# Patient Record
Sex: FEMALE | Race: OTHER | NOT HISPANIC OR LATINO | URBAN - METROPOLITAN AREA
[De-identification: names, ages, dates, MRNs, and addresses within clinical notes are randomized per-mention and may not be internally consistent; named-entity substitution may affect disease eponyms.]

---

## 2021-03-25 VITALS
HEART RATE: 60 BPM | SYSTOLIC BLOOD PRESSURE: 181 MMHG | RESPIRATION RATE: 16 BRPM | WEIGHT: 145.06 LBS | TEMPERATURE: 97 F | HEIGHT: 66 IN | DIASTOLIC BLOOD PRESSURE: 75 MMHG | OXYGEN SATURATION: 99 %

## 2021-03-25 NOTE — H&P ADULT - NSICDXPASTSURGICALHX_GEN_ALL_CORE_FT
PAST SURGICAL HISTORY:  H/O lumpectomy     H/O:      S/P appendectomy     S/P dilation and curettage

## 2021-03-25 NOTE — H&P ADULT - ASSESSMENT
75 yo F w/ a PMH of HTN, HLD, DM, TIA in 2011 (no residual deficits), PVD, Breast CA s/p chemo/radiation (on Anastrazole) who presented to outpatient cardiologist Dr. Juan Manuel Irvin for routine follow up. Pt states she is totally ASX.  Pt had a CCTA 2/24/21 with Ca score 1848 (90th percentile). Of note: remainder of CCTA had to be aborted because BP was too high.  Pt now presents for cardiac cath with possible intervention for suspected CAD.      ASA _III____		Mallampati class: __III_______	                Sedation Plan:  Moderate     Patient Is Suitable Candidate For Sedation: Yes       Risks & benefits of procedure and sedation and risks and benefits for the alternative therapy have been explained to the patient in layman’s terms including but not limited to: allergic reaction, bleeding, infection, arrhythmia, respiratory compromise, renal and vascular compromise, limb damage, MI, CVA, emergent CABG/Vascular Surgery and death. Informed consent obtained and in chart.    Plan:  -consent signed and in chart  -hypoglycemia - FS 69, treated with 1/2 amp of D50  -IVF: NS @ 75/hr   -Meds verified  -ASA 325mg given (documented NSAID allergy, however, pt states that she took aspirin in the past without any adverse allergic reaction)  -load Plavix 600mg POx1

## 2021-03-25 NOTE — H&P ADULT - NSICDXPASTMEDICALHX_GEN_ALL_CORE_FT
PAST MEDICAL HISTORY:  Breast cancer     DM (diabetes mellitus)     HLD (hyperlipidemia)     HTN (hypertension)

## 2021-03-25 NOTE — H&P ADULT - HISTORY OF PRESENT ILLNESS
Cardiologist: Dr. Juan Manuel Irvin  Escort: Daughter  Pharamcy: *[place] Patient*  COVID: 3/26 @ [facility]  *Verify Meds -- of note traveled from [facility] on 3/22 and quarantined x4days*    77 yo F w/ a PMH of HTN, HLD, DM, TIA in 2011 (no residual deficits), PVD, Breast CA s/p chemo/radiation (on Anastrazole) who presented to outpatient cardiologist Dr. Juan Manuel Irvin for routine follow up. Pt states she is totally ASX. Denies CP, SOB, dizziness, diaphoresis, palpitations, orthopnea/PND, abdominal pain, N/V/D, urinary sx, BRBPR, hematuria, melena, LE swelling, recent travel/sick contacts/illness. CCTA 2/24/21: Ca score 1848 (90th percentile). In light of patient’s risk factors and abnormal calcium score, patient is referred for cardiac catheterization with possible intervention if clinically indicated.    Cardiologist: Dr. Juan Manuel Irvin  Escort: Daughter  Pharamcy: *[place] Patient*  COVID: 3/26 @ [facility]  *Verify Meds -- of note traveled from [facility] on 3/22 and quarantined x4days*    75 yo F w/ a PMH of HTN, HLD, DM, TIA in 2011 (no residual deficits), PVD, Breast CA s/p chemo/radiation (on Anastrazole) who presented to outpatient cardiologist Dr. Juan Manuel Irvin for routine follow up. Pt states she is totally ASX. Denies CP, SOB, dizziness, diaphoresis, palpitations, orthopnea/PND, abdominal pain, N/V/D, urinary sx, BRBPR, hematuria, melena, LE swelling, recent travel/sick contacts/illness. CCTA 2/24/21: Ca score 1848 (90th percentile). Of note: remainder of CCTA had to be aborted because BP was too high. In light of patient’s risk factors and abnormal calcium score, patient is referred for cardiac catheterization with possible intervention if clinically indicated.    Cardiologist: Dr. Juan Manuel Irvin  Escort: Daughter  Pharamcy: *[place] Patient*  COVID: 3/26 @ [facility]  *Verify Meds -- of note traveled from [place] on 3/22 and quarantined x4days*  *Hx obtained via pts daughter Onur*    77 yo F w/ a PMH of HTN, HLD, DM, TIA in 2011 (no residual deficits), PVD, Breast CA s/p chemo/radiation (on Anastrazole) who presented to outpatient cardiologist Dr. Juan Manuel Irvin for routine follow up. Pt states she is totally ASX. Denies CP, SOB, dizziness, diaphoresis, palpitations, orthopnea/PND, abdominal pain, N/V/D, urinary sx, BRBPR, hematuria, melena, LE swelling, recent travel/sick contacts/illness. CCTA 2/24/21: Ca score 1848 (90th percentile). Of note: remainder of CCTA had to be aborted because BP was too high. In light of patient’s risk factors and abnormal calcium score, patient is referred for cardiac catheterization with possible intervention if clinically indicated.    Cardiologist: Dr. Juan Manuel Irvin  Escort: Daughter  Pharamcy: *[place] Patient*  COVID: 3/26 @ [facility]  *Of note traveled from [facility] on 3/22 and quarantined x 4 days*  *Hx obtained via pts daughter Onur*    75 yo F w/ a PMH of HTN, HLD, DM, TIA in 2011 (no residual deficits), PVD, Breast CA s/p chemo/radiation (on Anastrazole) who presented to outpatient cardiologist Dr. Juan Manuel Irvin for routine follow up. Pt states she is totally ASX. Denies CP, SOB, dizziness, diaphoresis, palpitations, orthopnea/PND, abdominal pain, N/V/D, urinary sx, BRBPR, hematuria, melena, LE swelling, recent travel/sick contacts/illness. CCTA 2/24/21: Ca score 1848 (90th percentile). Of note: remainder of CCTA had to be aborted because BP was too high. In light of patient’s risk factors and abnormal calcium score, patient is referred for cardiac catheterization with possible intervention if clinically indicated.    Cardiologist: Dr. Juan Manuel Irvin  Escort: Daughter  Pharamcy: *[place] Patient*  COVID: NEGATIVE 3/26 @ [facility]  *Of note traveled from [facility] on 3/22 and quarantined x 7 days*  *Hx obtained via pts daughter Onur*    75 yo F w/ a PMH of HTN, HLD, DM, TIA in 2011 (no residual deficits), PVD, Breast CA s/p chemo/radiation (on Anastrazole) who presented to outpatient cardiologist Dr. Juan Manuel Irvin for routine follow up. Pt states she is totally ASX. Denies CP, SOB, dizziness, diaphoresis, palpitations, orthopnea/PND, abdominal pain, N/V/D, urinary sx, BRBPR, hematuria, melena, LE swelling, recent travel/sick contacts/illness. CCTA 2/24/21: Ca score 1848 (90th percentile). Of note: remainder of CCTA had to be aborted because BP was too high. In light of patient’s risk factors and abnormal calcium score, patient is referred for cardiac catheterization with possible intervention if clinically indicated.

## 2021-03-25 NOTE — H&P ADULT - NSHPLABSRESULTS_GEN_ALL_CORE
ECG: NSR @ 60bpm, no acute ST-T changes, QTc 400      Labs:  CAPILLARY BLOOD GLUCOSE      POCT Blood Glucose.: 71 mg/dL (29 Mar 2021 07:40)                          11.1   5.82  )-----------( 168      ( 29 Mar 2021 07:48 )             35.6       Auto Neutrophil %: 55.2 % (03-29-21 @ 07:48)  Auto Immature Granulocyte %: 0.3 % (03-29-21 @ 07:48)    03-29    143  |  104  |  18  ----------------------------<  74  4.3   |  28  |  0.78      Calcium, Total Serum: 9.0 mg/dL (03-29-21 @ 07:48)      LFTs:             6.8  | 0.3  | 18       ------------------[125     ( 29 Mar 2021 07:48 )  3.9  | x    | 12            Coags:     11.1   ----< 0.92    ( 29 Mar 2021 07:48 )     35.0        CARDIAC MARKERS ( 29 Mar 2021 07:48 )  x     / x     / 50 U/L / x     / <1.0 ng/mL

## 2021-03-26 ENCOUNTER — OUTPATIENT (OUTPATIENT)
Dept: OUTPATIENT SERVICES | Facility: HOSPITAL | Age: 76
LOS: 1 days | End: 2021-03-26
Payer: COMMERCIAL

## 2021-03-26 DIAGNOSIS — Z98.890 OTHER SPECIFIED POSTPROCEDURAL STATES: Chronic | ICD-10-CM

## 2021-03-26 DIAGNOSIS — Z90.49 ACQUIRED ABSENCE OF OTHER SPECIFIED PARTS OF DIGESTIVE TRACT: Chronic | ICD-10-CM

## 2021-03-26 DIAGNOSIS — Z20.822 CONTACT WITH AND (SUSPECTED) EXPOSURE TO COVID-19: ICD-10-CM

## 2021-03-26 DIAGNOSIS — Z98.891 HISTORY OF UTERINE SCAR FROM PREVIOUS SURGERY: Chronic | ICD-10-CM

## 2021-03-26 LAB — SARS-COV-2 RNA SPEC QL NAA+PROBE: SIGNIFICANT CHANGE UP

## 2021-03-26 PROCEDURE — U0003: CPT

## 2021-03-26 PROCEDURE — U0005: CPT

## 2021-03-29 ENCOUNTER — INPATIENT (INPATIENT)
Facility: HOSPITAL | Age: 76
LOS: 3 days | Discharge: HOME | DRG: 246 | End: 2021-04-02
Attending: PSYCHIATRY & NEUROLOGY | Admitting: PSYCHIATRY & NEUROLOGY
Payer: COMMERCIAL

## 2021-03-29 DIAGNOSIS — Z90.49 ACQUIRED ABSENCE OF OTHER SPECIFIED PARTS OF DIGESTIVE TRACT: Chronic | ICD-10-CM

## 2021-03-29 DIAGNOSIS — Z98.891 HISTORY OF UTERINE SCAR FROM PREVIOUS SURGERY: Chronic | ICD-10-CM

## 2021-03-29 DIAGNOSIS — Z98.890 OTHER SPECIFIED POSTPROCEDURAL STATES: Chronic | ICD-10-CM

## 2021-03-29 LAB
A1C WITH ESTIMATED AVERAGE GLUCOSE RESULT: 8.5 % — HIGH (ref 4–5.6)
ALBUMIN SERPL ELPH-MCNC: 3.9 G/DL — SIGNIFICANT CHANGE UP (ref 3.3–5)
ALBUMIN SERPL ELPH-MCNC: 4 G/DL — SIGNIFICANT CHANGE UP (ref 3.3–5)
ALP SERPL-CCNC: 125 U/L — HIGH (ref 40–120)
ALP SERPL-CCNC: 141 U/L — HIGH (ref 40–120)
ALT FLD-CCNC: 12 U/L — SIGNIFICANT CHANGE UP (ref 10–45)
ALT FLD-CCNC: 13 U/L — SIGNIFICANT CHANGE UP (ref 10–45)
ANION GAP SERPL CALC-SCNC: 11 MMOL/L — SIGNIFICANT CHANGE UP (ref 5–17)
ANION GAP SERPL CALC-SCNC: 16 MMOL/L — SIGNIFICANT CHANGE UP (ref 5–17)
ANISOCYTOSIS BLD QL: SLIGHT — SIGNIFICANT CHANGE UP
APPEARANCE UR: SIGNIFICANT CHANGE UP
APTT BLD: 30.5 SEC — SIGNIFICANT CHANGE UP (ref 27.5–35.5)
APTT BLD: 35 SEC — SIGNIFICANT CHANGE UP (ref 27.5–35.5)
APTT BLD: >200 SEC — CRITICAL HIGH (ref 27.5–35.5)
AST SERPL-CCNC: 18 U/L — SIGNIFICANT CHANGE UP (ref 10–40)
AST SERPL-CCNC: 32 U/L — SIGNIFICANT CHANGE UP (ref 10–40)
BACTERIA # UR AUTO: PRESENT /HPF
BASOPHILS # BLD AUTO: 0 K/UL — SIGNIFICANT CHANGE UP (ref 0–0.2)
BASOPHILS # BLD AUTO: 0.04 K/UL — SIGNIFICANT CHANGE UP (ref 0–0.2)
BASOPHILS NFR BLD AUTO: 0 % — SIGNIFICANT CHANGE UP (ref 0–2)
BASOPHILS NFR BLD AUTO: 0.7 % — SIGNIFICANT CHANGE UP (ref 0–2)
BILIRUB SERPL-MCNC: 0.3 MG/DL — SIGNIFICANT CHANGE UP (ref 0.2–1.2)
BILIRUB SERPL-MCNC: 0.8 MG/DL — SIGNIFICANT CHANGE UP (ref 0.2–1.2)
BILIRUB UR-MCNC: NEGATIVE — SIGNIFICANT CHANGE UP
BUN SERPL-MCNC: 13 MG/DL — SIGNIFICANT CHANGE UP (ref 7–23)
BUN SERPL-MCNC: 18 MG/DL — SIGNIFICANT CHANGE UP (ref 7–23)
CALCIUM SERPL-MCNC: 9 MG/DL — SIGNIFICANT CHANGE UP (ref 8.4–10.5)
CALCIUM SERPL-MCNC: 9.3 MG/DL — SIGNIFICANT CHANGE UP (ref 8.4–10.5)
CHLORIDE SERPL-SCNC: 104 MMOL/L — SIGNIFICANT CHANGE UP (ref 96–108)
CHLORIDE SERPL-SCNC: 97 MMOL/L — SIGNIFICANT CHANGE UP (ref 96–108)
CHOLEST SERPL-MCNC: 131 MG/DL — SIGNIFICANT CHANGE UP
CK MB CFR SERPL CALC: <1 NG/ML — SIGNIFICANT CHANGE UP (ref 0–6.7)
CK SERPL-CCNC: 50 U/L — SIGNIFICANT CHANGE UP (ref 25–170)
CO2 SERPL-SCNC: 26 MMOL/L — SIGNIFICANT CHANGE UP (ref 22–31)
CO2 SERPL-SCNC: 28 MMOL/L — SIGNIFICANT CHANGE UP (ref 22–31)
COLOR SPEC: YELLOW — SIGNIFICANT CHANGE UP
CREAT SERPL-MCNC: 0.77 MG/DL — SIGNIFICANT CHANGE UP (ref 0.5–1.3)
CREAT SERPL-MCNC: 0.78 MG/DL — SIGNIFICANT CHANGE UP (ref 0.5–1.3)
DIFF PNL FLD: ABNORMAL
EOSINOPHIL # BLD AUTO: 0 K/UL — SIGNIFICANT CHANGE UP (ref 0–0.5)
EOSINOPHIL # BLD AUTO: 0.3 K/UL — SIGNIFICANT CHANGE UP (ref 0–0.5)
EOSINOPHIL NFR BLD AUTO: 0 % — SIGNIFICANT CHANGE UP (ref 0–6)
EOSINOPHIL NFR BLD AUTO: 5.2 % — SIGNIFICANT CHANGE UP (ref 0–6)
EPI CELLS # UR: SIGNIFICANT CHANGE UP /HPF (ref 0–5)
ESTIMATED AVERAGE GLUCOSE: 197 MG/DL — HIGH (ref 68–114)
GIANT PLATELETS BLD QL SMEAR: PRESENT — SIGNIFICANT CHANGE UP
GLUCOSE BLDC GLUCOMTR-MCNC: 138 MG/DL — HIGH (ref 70–99)
GLUCOSE BLDC GLUCOMTR-MCNC: 160 MG/DL — HIGH (ref 70–99)
GLUCOSE BLDC GLUCOMTR-MCNC: 169 MG/DL — HIGH (ref 70–99)
GLUCOSE BLDC GLUCOMTR-MCNC: 174 MG/DL — HIGH (ref 70–99)
GLUCOSE BLDC GLUCOMTR-MCNC: 71 MG/DL — SIGNIFICANT CHANGE UP (ref 70–99)
GLUCOSE BLDC GLUCOMTR-MCNC: 93 MG/DL — SIGNIFICANT CHANGE UP (ref 70–99)
GLUCOSE SERPL-MCNC: 166 MG/DL — HIGH (ref 70–99)
GLUCOSE SERPL-MCNC: 74 MG/DL — SIGNIFICANT CHANGE UP (ref 70–99)
GLUCOSE UR QL: 250
HCT VFR BLD CALC: 35.6 % — SIGNIFICANT CHANGE UP (ref 34.5–45)
HCT VFR BLD CALC: 38.2 % — SIGNIFICANT CHANGE UP (ref 34.5–45)
HDLC SERPL-MCNC: 61 MG/DL — SIGNIFICANT CHANGE UP
HGB BLD-MCNC: 11.1 G/DL — LOW (ref 11.5–15.5)
HGB BLD-MCNC: 12.5 G/DL — SIGNIFICANT CHANGE UP (ref 11.5–15.5)
IMM GRANULOCYTES NFR BLD AUTO: 0.3 % — SIGNIFICANT CHANGE UP (ref 0–1.5)
INR BLD: 0.92 — SIGNIFICANT CHANGE UP (ref 0.88–1.16)
INR BLD: 1.08 — SIGNIFICANT CHANGE UP (ref 0.88–1.16)
KETONES UR-MCNC: 15 MG/DL
LACTATE SERPL-SCNC: 3.4 MMOL/L — HIGH (ref 0.5–2)
LEUKOCYTE ESTERASE UR-ACNC: NEGATIVE — SIGNIFICANT CHANGE UP
LIPID PNL WITH DIRECT LDL SERPL: 54 MG/DL — SIGNIFICANT CHANGE UP
LYMPHOCYTES # BLD AUTO: 0.29 K/UL — LOW (ref 1–3.3)
LYMPHOCYTES # BLD AUTO: 1.69 K/UL — SIGNIFICANT CHANGE UP (ref 1–3.3)
LYMPHOCYTES # BLD AUTO: 2.6 % — LOW (ref 13–44)
LYMPHOCYTES # BLD AUTO: 29 % — SIGNIFICANT CHANGE UP (ref 13–44)
MACROCYTES BLD QL: SLIGHT — SIGNIFICANT CHANGE UP
MANUAL SMEAR VERIFICATION: SIGNIFICANT CHANGE UP
MCHC RBC-ENTMCNC: 29.7 PG — SIGNIFICANT CHANGE UP (ref 27–34)
MCHC RBC-ENTMCNC: 30.6 PG — SIGNIFICANT CHANGE UP (ref 27–34)
MCHC RBC-ENTMCNC: 31.2 GM/DL — LOW (ref 32–36)
MCHC RBC-ENTMCNC: 32.7 GM/DL — SIGNIFICANT CHANGE UP (ref 32–36)
MCV RBC AUTO: 93.6 FL — SIGNIFICANT CHANGE UP (ref 80–100)
MCV RBC AUTO: 95.2 FL — SIGNIFICANT CHANGE UP (ref 80–100)
MONOCYTES # BLD AUTO: 0 K/UL — SIGNIFICANT CHANGE UP (ref 0–0.9)
MONOCYTES # BLD AUTO: 0.56 K/UL — SIGNIFICANT CHANGE UP (ref 0–0.9)
MONOCYTES NFR BLD AUTO: 0 % — LOW (ref 2–14)
MONOCYTES NFR BLD AUTO: 9.6 % — SIGNIFICANT CHANGE UP (ref 2–14)
NEUTROPHILS # BLD AUTO: 11.05 K/UL — HIGH (ref 1.8–7.4)
NEUTROPHILS # BLD AUTO: 3.21 K/UL — SIGNIFICANT CHANGE UP (ref 1.8–7.4)
NEUTROPHILS NFR BLD AUTO: 55.2 % — SIGNIFICANT CHANGE UP (ref 43–77)
NEUTROPHILS NFR BLD AUTO: 96.5 % — HIGH (ref 43–77)
NEUTS BAND # BLD: 0.9 % — SIGNIFICANT CHANGE UP (ref 0–8)
NITRITE UR-MCNC: POSITIVE
NON HDL CHOLESTEROL: 70 MG/DL — SIGNIFICANT CHANGE UP
NRBC # BLD: 0 /100 WBCS — SIGNIFICANT CHANGE UP (ref 0–0)
PH UR: 7.5 — SIGNIFICANT CHANGE UP (ref 5–8)
PLAT MORPH BLD: NORMAL — SIGNIFICANT CHANGE UP
PLATELET # BLD AUTO: 160 K/UL — SIGNIFICANT CHANGE UP (ref 150–400)
PLATELET # BLD AUTO: 168 K/UL — SIGNIFICANT CHANGE UP (ref 150–400)
POLYCHROMASIA BLD QL SMEAR: SLIGHT — SIGNIFICANT CHANGE UP
POTASSIUM SERPL-MCNC: 3.5 MMOL/L — SIGNIFICANT CHANGE UP (ref 3.5–5.3)
POTASSIUM SERPL-MCNC: 4.3 MMOL/L — SIGNIFICANT CHANGE UP (ref 3.5–5.3)
POTASSIUM SERPL-SCNC: 3.5 MMOL/L — SIGNIFICANT CHANGE UP (ref 3.5–5.3)
POTASSIUM SERPL-SCNC: 4.3 MMOL/L — SIGNIFICANT CHANGE UP (ref 3.5–5.3)
PROT SERPL-MCNC: 6.8 G/DL — SIGNIFICANT CHANGE UP (ref 6–8.3)
PROT SERPL-MCNC: 7.4 G/DL — SIGNIFICANT CHANGE UP (ref 6–8.3)
PROT UR-MCNC: >=300 MG/DL
PROTHROM AB SERPL-ACNC: 11.1 SEC — SIGNIFICANT CHANGE UP (ref 10.6–13.6)
PROTHROM AB SERPL-ACNC: 12.9 SEC — SIGNIFICANT CHANGE UP (ref 10.6–13.6)
RBC # BLD: 3.74 M/UL — LOW (ref 3.8–5.2)
RBC # BLD: 4.08 M/UL — SIGNIFICANT CHANGE UP (ref 3.8–5.2)
RBC # FLD: 13.8 % — SIGNIFICANT CHANGE UP (ref 10.3–14.5)
RBC # FLD: 14 % — SIGNIFICANT CHANGE UP (ref 10.3–14.5)
RBC BLD AUTO: ABNORMAL
RBC CASTS # UR COMP ASSIST: ABNORMAL /HPF
SCHISTOCYTES BLD QL AUTO: SLIGHT — SIGNIFICANT CHANGE UP
SODIUM SERPL-SCNC: 139 MMOL/L — SIGNIFICANT CHANGE UP (ref 135–145)
SODIUM SERPL-SCNC: 143 MMOL/L — SIGNIFICANT CHANGE UP (ref 135–145)
SP GR SPEC: 1.01 — SIGNIFICANT CHANGE UP (ref 1–1.03)
TRIGL SERPL-MCNC: 78 MG/DL — SIGNIFICANT CHANGE UP
TSH SERPL-MCNC: 2.84 UIU/ML — SIGNIFICANT CHANGE UP (ref 0.35–4.94)
UROBILINOGEN FLD QL: 0.2 E.U./DL — SIGNIFICANT CHANGE UP
WBC # BLD: 11.34 K/UL — HIGH (ref 3.8–10.5)
WBC # BLD: 5.82 K/UL — SIGNIFICANT CHANGE UP (ref 3.8–10.5)
WBC # FLD AUTO: 11.34 K/UL — HIGH (ref 3.8–10.5)
WBC # FLD AUTO: 5.82 K/UL — SIGNIFICANT CHANGE UP (ref 3.8–10.5)
WBC UR QL: < 5 /HPF — SIGNIFICANT CHANGE UP

## 2021-03-29 PROCEDURE — 70450 CT HEAD/BRAIN W/O DYE: CPT | Mod: 26,77

## 2021-03-29 PROCEDURE — 70496 CT ANGIOGRAPHY HEAD: CPT | Mod: 26

## 2021-03-29 PROCEDURE — 74176 CT ABD & PELVIS W/O CONTRAST: CPT | Mod: 26

## 2021-03-29 PROCEDURE — 71045 X-RAY EXAM CHEST 1 VIEW: CPT | Mod: 26

## 2021-03-29 PROCEDURE — 99152 MOD SED SAME PHYS/QHP 5/>YRS: CPT

## 2021-03-29 PROCEDURE — 70498 CT ANGIOGRAPHY NECK: CPT | Mod: 26

## 2021-03-29 PROCEDURE — 0042T: CPT

## 2021-03-29 PROCEDURE — 99253 IP/OBS CNSLTJ NEW/EST LOW 45: CPT

## 2021-03-29 PROCEDURE — 74018 RADEX ABDOMEN 1 VIEW: CPT | Mod: 26

## 2021-03-29 PROCEDURE — 93458 L HRT ARTERY/VENTRICLE ANGIO: CPT | Mod: 26,59

## 2021-03-29 PROCEDURE — 92928 PRQ TCAT PLMT NTRAC ST 1 LES: CPT | Mod: RC

## 2021-03-29 PROCEDURE — 70450 CT HEAD/BRAIN W/O DYE: CPT | Mod: 26,77,59

## 2021-03-29 RX ORDER — SODIUM CHLORIDE 9 MG/ML
250 INJECTION INTRAMUSCULAR; INTRAVENOUS; SUBCUTANEOUS ONCE
Refills: 0 | Status: COMPLETED | OUTPATIENT
Start: 2021-03-29 | End: 2021-03-29

## 2021-03-29 RX ORDER — ASPIRIN/CALCIUM CARB/MAGNESIUM 324 MG
325 TABLET ORAL ONCE
Refills: 0 | Status: COMPLETED | OUTPATIENT
Start: 2021-03-29 | End: 2021-03-29

## 2021-03-29 RX ORDER — SODIUM CHLORIDE 9 MG/ML
1000 INJECTION, SOLUTION INTRAVENOUS
Refills: 0 | Status: DISCONTINUED | OUTPATIENT
Start: 2021-03-29 | End: 2021-04-02

## 2021-03-29 RX ORDER — METOPROLOL TARTRATE 50 MG
100 TABLET ORAL DAILY
Refills: 0 | Status: DISCONTINUED | OUTPATIENT
Start: 2021-03-29 | End: 2021-03-30

## 2021-03-29 RX ORDER — HYDRALAZINE HCL 50 MG
25 TABLET ORAL ONCE
Refills: 0 | Status: DISCONTINUED | OUTPATIENT
Start: 2021-03-29 | End: 2021-03-29

## 2021-03-29 RX ORDER — ONDANSETRON 8 MG/1
4 TABLET, FILM COATED ORAL ONCE
Refills: 0 | Status: COMPLETED | OUTPATIENT
Start: 2021-03-29 | End: 2021-03-29

## 2021-03-29 RX ORDER — ANASTROZOLE 1 MG/1
1 TABLET ORAL DAILY
Refills: 0 | Status: DISCONTINUED | OUTPATIENT
Start: 2021-03-29 | End: 2021-03-31

## 2021-03-29 RX ORDER — ACETAMINOPHEN 500 MG
1000 TABLET ORAL ONCE
Refills: 0 | Status: COMPLETED | OUTPATIENT
Start: 2021-03-29 | End: 2021-03-29

## 2021-03-29 RX ORDER — DEXTROSE 50 % IN WATER 50 %
25 SYRINGE (ML) INTRAVENOUS ONCE
Refills: 0 | Status: COMPLETED | OUTPATIENT
Start: 2021-03-29 | End: 2021-03-29

## 2021-03-29 RX ORDER — MIDAZOLAM HYDROCHLORIDE 1 MG/ML
2 INJECTION, SOLUTION INTRAMUSCULAR; INTRAVENOUS ONCE
Refills: 0 | Status: DISCONTINUED | OUTPATIENT
Start: 2021-03-29 | End: 2021-03-29

## 2021-03-29 RX ORDER — ASPIRIN/CALCIUM CARB/MAGNESIUM 324 MG
81 TABLET ORAL DAILY
Refills: 0 | Status: DISCONTINUED | OUTPATIENT
Start: 2021-03-30 | End: 2021-04-02

## 2021-03-29 RX ORDER — DEXTROSE 50 % IN WATER 50 %
12.5 SYRINGE (ML) INTRAVENOUS ONCE
Refills: 0 | Status: DISCONTINUED | OUTPATIENT
Start: 2021-03-29 | End: 2021-04-02

## 2021-03-29 RX ORDER — SODIUM CHLORIDE 9 MG/ML
250 INJECTION INTRAMUSCULAR; INTRAVENOUS; SUBCUTANEOUS ONCE
Refills: 0 | Status: DISCONTINUED | OUTPATIENT
Start: 2021-03-29 | End: 2021-03-30

## 2021-03-29 RX ORDER — CLOPIDOGREL BISULFATE 75 MG/1
75 TABLET, FILM COATED ORAL DAILY
Refills: 0 | Status: DISCONTINUED | OUTPATIENT
Start: 2021-03-30 | End: 2021-04-02

## 2021-03-29 RX ORDER — DEXTROSE 50 % IN WATER 50 %
15 SYRINGE (ML) INTRAVENOUS ONCE
Refills: 0 | Status: DISCONTINUED | OUTPATIENT
Start: 2021-03-29 | End: 2021-04-02

## 2021-03-29 RX ORDER — INFLUENZA VIRUS VACCINE 15; 15; 15; 15 UG/.5ML; UG/.5ML; UG/.5ML; UG/.5ML
0.5 SUSPENSION INTRAMUSCULAR ONCE
Refills: 0 | Status: DISCONTINUED | OUTPATIENT
Start: 2021-03-29 | End: 2021-03-29

## 2021-03-29 RX ORDER — ATORVASTATIN CALCIUM 80 MG/1
80 TABLET, FILM COATED ORAL AT BEDTIME
Refills: 0 | Status: DISCONTINUED | OUTPATIENT
Start: 2021-03-29 | End: 2021-04-02

## 2021-03-29 RX ORDER — ATORVASTATIN CALCIUM 80 MG/1
40 TABLET, FILM COATED ORAL AT BEDTIME
Refills: 0 | Status: DISCONTINUED | OUTPATIENT
Start: 2021-03-29 | End: 2021-03-29

## 2021-03-29 RX ORDER — MIDAZOLAM HYDROCHLORIDE 1 MG/ML
1 INJECTION, SOLUTION INTRAMUSCULAR; INTRAVENOUS ONCE
Refills: 0 | Status: DISCONTINUED | OUTPATIENT
Start: 2021-03-29 | End: 2021-03-29

## 2021-03-29 RX ORDER — DEXTROSE 50 % IN WATER 50 %
25 SYRINGE (ML) INTRAVENOUS ONCE
Refills: 0 | Status: DISCONTINUED | OUTPATIENT
Start: 2021-03-29 | End: 2021-03-29

## 2021-03-29 RX ORDER — ACETAMINOPHEN 500 MG
650 TABLET ORAL EVERY 6 HOURS
Refills: 0 | Status: DISCONTINUED | OUTPATIENT
Start: 2021-03-29 | End: 2021-04-02

## 2021-03-29 RX ORDER — SODIUM CHLORIDE 9 MG/ML
500 INJECTION INTRAMUSCULAR; INTRAVENOUS; SUBCUTANEOUS
Refills: 0 | Status: DISCONTINUED | OUTPATIENT
Start: 2021-03-29 | End: 2021-03-30

## 2021-03-29 RX ORDER — INSULIN LISPRO 100/ML
3 VIAL (ML) SUBCUTANEOUS
Refills: 0 | Status: DISCONTINUED | OUTPATIENT
Start: 2021-03-29 | End: 2021-03-31

## 2021-03-29 RX ORDER — INSULIN LISPRO 100/ML
VIAL (ML) SUBCUTANEOUS
Refills: 0 | Status: DISCONTINUED | OUTPATIENT
Start: 2021-03-29 | End: 2021-04-02

## 2021-03-29 RX ORDER — CLOPIDOGREL BISULFATE 75 MG/1
600 TABLET, FILM COATED ORAL ONCE
Refills: 0 | Status: COMPLETED | OUTPATIENT
Start: 2021-03-29 | End: 2021-03-29

## 2021-03-29 RX ORDER — LABETALOL HCL 100 MG
10 TABLET ORAL ONCE
Refills: 0 | Status: COMPLETED | OUTPATIENT
Start: 2021-03-29 | End: 2021-03-29

## 2021-03-29 RX ORDER — SODIUM CHLORIDE 9 MG/ML
500 INJECTION INTRAMUSCULAR; INTRAVENOUS; SUBCUTANEOUS
Refills: 0 | Status: DISCONTINUED | OUTPATIENT
Start: 2021-03-29 | End: 2021-03-29

## 2021-03-29 RX ORDER — INSULIN GLARGINE 100 [IU]/ML
9 INJECTION, SOLUTION SUBCUTANEOUS EVERY MORNING
Refills: 0 | Status: DISCONTINUED | OUTPATIENT
Start: 2021-03-30 | End: 2021-04-01

## 2021-03-29 RX ORDER — NALOXONE HYDROCHLORIDE 4 MG/.1ML
0.4 SPRAY NASAL ONCE
Refills: 0 | Status: DISCONTINUED | OUTPATIENT
Start: 2021-03-29 | End: 2021-03-30

## 2021-03-29 RX ORDER — FLUMAZENIL 0.1 MG/ML
0.2 VIAL (ML) INTRAVENOUS ONCE
Refills: 0 | Status: COMPLETED | OUTPATIENT
Start: 2021-03-29 | End: 2021-03-29

## 2021-03-29 RX ORDER — OLANZAPINE 15 MG/1
5 TABLET, FILM COATED ORAL ONCE
Refills: 0 | Status: COMPLETED | OUTPATIENT
Start: 2021-03-29 | End: 2021-03-29

## 2021-03-29 RX ORDER — CHLORHEXIDINE GLUCONATE 213 G/1000ML
1 SOLUTION TOPICAL ONCE
Refills: 0 | Status: DISCONTINUED | OUTPATIENT
Start: 2021-03-29 | End: 2021-03-29

## 2021-03-29 RX ORDER — HYDRALAZINE HCL 50 MG
10 TABLET ORAL ONCE
Refills: 0 | Status: COMPLETED | OUTPATIENT
Start: 2021-03-29 | End: 2021-03-29

## 2021-03-29 RX ORDER — DEXTROSE 50 % IN WATER 50 %
25 SYRINGE (ML) INTRAVENOUS ONCE
Refills: 0 | Status: DISCONTINUED | OUTPATIENT
Start: 2021-03-29 | End: 2021-04-02

## 2021-03-29 RX ORDER — METOPROLOL TARTRATE 50 MG
100 TABLET ORAL DAILY
Refills: 0 | Status: DISCONTINUED | OUTPATIENT
Start: 2021-03-29 | End: 2021-03-29

## 2021-03-29 RX ORDER — GLUCAGON INJECTION, SOLUTION 0.5 MG/.1ML
1 INJECTION, SOLUTION SUBCUTANEOUS ONCE
Refills: 0 | Status: DISCONTINUED | OUTPATIENT
Start: 2021-03-29 | End: 2021-04-02

## 2021-03-29 RX ORDER — HEPARIN SODIUM 5000 [USP'U]/ML
5000 INJECTION INTRAVENOUS; SUBCUTANEOUS EVERY 8 HOURS
Refills: 0 | Status: DISCONTINUED | OUTPATIENT
Start: 2021-03-29 | End: 2021-04-02

## 2021-03-29 RX ADMIN — Medication 400 MILLIGRAM(S): at 20:03

## 2021-03-29 RX ADMIN — MIDAZOLAM HYDROCHLORIDE 1 MILLIGRAM(S): 1 INJECTION, SOLUTION INTRAMUSCULAR; INTRAVENOUS at 22:51

## 2021-03-29 RX ADMIN — Medication 10 MILLIGRAM(S): at 12:28

## 2021-03-29 RX ADMIN — HEPARIN SODIUM 5000 UNIT(S): 5000 INJECTION INTRAVENOUS; SUBCUTANEOUS at 23:11

## 2021-03-29 RX ADMIN — Medication 10 MILLIGRAM(S): at 15:45

## 2021-03-29 RX ADMIN — ONDANSETRON 4 MILLIGRAM(S): 8 TABLET, FILM COATED ORAL at 14:39

## 2021-03-29 RX ADMIN — Medication 1: at 16:35

## 2021-03-29 RX ADMIN — Medication 325 MILLIGRAM(S): at 08:36

## 2021-03-29 RX ADMIN — ONDANSETRON 4 MILLIGRAM(S): 8 TABLET, FILM COATED ORAL at 12:28

## 2021-03-29 RX ADMIN — Medication 650 MILLIGRAM(S): at 17:22

## 2021-03-29 RX ADMIN — Medication 1: at 23:18

## 2021-03-29 RX ADMIN — SODIUM CHLORIDE 1000 MILLILITER(S): 9 INJECTION INTRAMUSCULAR; INTRAVENOUS; SUBCUTANEOUS at 23:45

## 2021-03-29 RX ADMIN — Medication 25 MILLILITER(S): at 08:13

## 2021-03-29 RX ADMIN — Medication 0.2 MILLIGRAM(S): at 12:28

## 2021-03-29 RX ADMIN — Medication 1000 MILLIGRAM(S): at 21:33

## 2021-03-29 RX ADMIN — Medication 650 MILLIGRAM(S): at 16:35

## 2021-03-29 RX ADMIN — SODIUM CHLORIDE 75 MILLILITER(S): 9 INJECTION INTRAMUSCULAR; INTRAVENOUS; SUBCUTANEOUS at 15:12

## 2021-03-29 RX ADMIN — CLOPIDOGREL BISULFATE 600 MILLIGRAM(S): 75 TABLET, FILM COATED ORAL at 08:37

## 2021-03-29 NOTE — CONSULT NOTE ADULT - SUBJECTIVE AND OBJECTIVE BOX
HPI:   76 F w/ hx of CAD, HTN, DM, HLD, TIA (), PVD, Breat CA (s/p chemo/radiation) admitted to stroke ICU for acute CVA post cardiac catheterization. Cardiology consulted for continued management of coronary artery disease. Patient underwent CCTA, which revealed elevated calcium score. Given her risk factors and elevated calcium score, she was taken for LHC, which revealed normal LM, mLAD 75% stenosis (IFR 0.78), OM2 80% stenosis, pRCA severely calcified 75% stenosis. s/p ABDOULAYE to mLAD, ABDOULAYE to OM2, shockwave/ABDOULAYE to pRCA. Post catheterization, she was confused with difficulty following commands. Stroke code was called, for which she underwent CTH, CT brain perfusion, CT angio head & neck. CTH negative, CTA showed no large vessel occlusion. Given that patient received heparin bolus and PTT >200, tPA was not administered. CT perfusion suggestive of right MCA stroke. Patient seen and examined by cardiology consult team. She is nauseous and vomiting. She is however, without chest pain or SOB      CARDIAC DIAGNOSTIC TESTING:      TELEMETRY: Sinus tachycardia    ECG: NSR, without acute ischemic changes     ECHO FINDINGS: pending     CATHETERIZATION FINDINGS:  3/29/21: s/p ABDOULAYE mLAD, s/p ABDOULAYE OM2, s/p shockwave/ABDOULAYE pRCA  Findings: LM normal, mLAD 75% (IFR 0.78), OM2 80%, pRCA 75% (severely calcified)  LV normal, EDP 10      PAST MEDICAL & SURGICAL HISTORY:  Breast cancer    DM (diabetes mellitus)    HLD (hyperlipidemia)    HTN (hypertension)    H/O lumpectomy    S/P dilation and curettage    S/P appendectomy    H/O:         HOME MEDICATIONS  T(C): 37.9 (21 @ 16:00), Max: 37.9 (21 @ 16:00)  HR: 111 (21 @ 16:00) (98 - 112)  BP: 163/90 (21 @ 16:00) (150/111 - 247/134)  RR: 24 (21 @ 16:00) (14 - 29)  SpO2: 98% (21 @ 16:00) (98% - 99%)    MEDICATIONS  (STANDING):  anastrozole 1 milliGRAM(s) Oral daily  atorvastatin 80 milliGRAM(s) Oral at bedtime  dextrose 40% Gel 15 Gram(s) Oral Once  dextrose 5%. 1000 milliLiter(s) (50 mL/Hr) IV Continuous <Continuous>  dextrose 5%. 1000 milliLiter(s) (100 mL/Hr) IV Continuous <Continuous>  dextrose 50% Injectable 25 Gram(s) IV Push Once  dextrose 50% Injectable 12.5 Gram(s) IV Push Once  dextrose 50% Injectable 25 Gram(s) IV Push Once  glucagon  Injectable 1 milliGRAM(s) IntraMuscular Once  insulin lispro (ADMELOG) corrective regimen sliding scale   SubCutaneous Before meals and at bedtime  insulin lispro Injectable (ADMELOG) 3 Unit(s) SubCutaneous three times a day with meals  naloxone Injectable 0.4 milliGRAM(s) IV Push once  sodium chloride 0.9% Bolus 250 milliLiter(s) IV Bolus once  sodium chloride 0.9%. 500 milliLiter(s) (75 mL/Hr) IV Continuous <Continuous>    MEDICATIONS  (PRN):      FAMILY HISTORY:  Family history of early CAD  stent in sister      REVIEW OF SYSTEMS:  unable to obtain     Vitals past 24 Hours: T(C): 37.9 (21 @ 16:00), Max: 37.9 (21 @ 16:00)  HR: 111 (21 @ 16:00) (98 - 112)  BP: 163/90 (21 @ 16:00) (150/111 - 247/134)  RR: 24 (21 @ 16:00) (14 - 29)  SpO2: 98% (21 @ 16:00) (98% - 99%)	    PHYSICAL EXAM:  GEN: No acute respiratory distress, appears stated age	  HEENT: Anicteric sclera, PERRL, EOMI, no oropharyngeal erythema or discharge, trachea midline  Lymphatic: No cervical lymphadenopathy  Cardiovascular: S1 S2, No JVD, No murmurs, PMI  Respiratory: Lungs clear to auscultation, no rrw  Gastrointestinal:  BS+, soft, non distended, non tender, no HSM  Skin: No rashes, No ecchymoses, No cyanosis  Neurologic: Non-focal, AAO x 3, strength grossly normal in all extremeties  Extremities: Normal range of motion, No clubbing, cyanosis or edema  Vascular: Radial 2+, DP 2+      I&O's Detail      LABS:                        11.1   5.82  )-----------( 168      ( 29 Mar 2021 07:48 )             35.6         143  |  104  |  18  ----------------------------<  74  4.3   |  28  |  0.78    Ca    9.0      29 Mar 2021 07:48    TPro  6.8  /  Alb  3.9  /  TBili  0.3  /  DBili  x   /  AST  18  /  ALT  12  /  AlkPhos  125<H>  03-29    CARDIAC MARKERS ( 29 Mar 2021 07:48 )  x     / x     / 50 U/L / x     / <1.0 ng/mL      PT/INR - ( 29 Mar 2021 13:25 )   PT: 12.9 sec;   INR: 1.08          PTT - ( 29 Mar 2021 13:25 )  PTT:>200.0 sec    I&O's Summary      RADIOLOGY & ADDITIONAL STUDIES:

## 2021-03-29 NOTE — PROGRESS NOTE ADULT - ASSESSMENT
77 yo F w/ a PMH of HTN, HLD, DM, TIA in 2011 (no residual deficits), PVD, Breast CA s/p chemo/radiation (on Anastrazole) s/p cardiac cath and had 3 stents placed on 3/29.  Stroke code was called about 15 minutes post cardiac cath due to confusion, difficulty following commands and getting words out. HCT negative. CTA showed no large vessel occlusion. Given that patient received heparin bolus and PTT >200, tPA was not administered. Initial NIHSS 3, however, when BP was controlled to 150/80, patient was found to have some slight left sided weakness and left sided neglect which matches decreased perfusion area along the right MCA territory. Therefore, it is possible that patient has right MCA stroke.     NEURO  #R/o Right MCA Stroke  Stroke code was called about 15 minutes post cardiac cath due to confusion, difficulty following commands and getting words out. HCT negative. CTA showed no large vessel occlusion. Given that patient received heparin bolus and PTT >200, tPA was not administered. Initial NIHSS 3, however, when BP was controlled to 150/80, patient was found to have some slight left sided weakness and left sided neglect which matches decreased perfusion area along the right MCA territory.  - Closely follow neuro checks every 1 hour  - Repeat HCT for acute changes in neuro exam  - Obtain MRI Brain without contrast  - Obtain TTE with bubble  - Goal -180  - Bedside Dysphagia Screen  - PT/OT/SLP   - Obtain Hemoglobin A1C, Lipid Profile Panel, and TSH    Secondary Stroke Prevention Medication  - continue ASA 81mg & Plavix 75mg - was loaded with ASA 325mg and Plavix 600mg this morning  - Increase atorvastatin to 80mg daily- cholesterol and stroke prevention   - Start heparin SQ and SCDs for DVT prophylaxis     RESPIRATORY  No active issues    CARDIOVASCULAR  #Active CAD  - s/p cardiac cath and had 3 stents placed  - On ASA 81 and plavix 75    #HTN  - on home lisinopril 10 and toprol 100    #HLD  - on home rosuvastatin 10    GI    RENAL    ENDO  #DM2  - on home metformin 500 BID, januvia 100 qd  - on Novolog 4 at lunch, tresiba 14 daily    ID    HEME-ONC  #h/o breast cancer  - on home anastrazole 1 qd    F: None   E: Replete for K<4 Mag<2  N: Regular Diet  A: HSQ  Code status: full  Dispo: MICU 75 yo F w/ a PMH of HTN, HLD, DM, TIA in 2011 (no residual deficits), PVD, Breast CA s/p chemo/radiation (on Anastrazole) s/p cardiac cath and had 3 stents placed on 3/29.  Stroke code was called about 15 minutes post cardiac cath due to confusion, difficulty following commands and getting words out. HCT negative. CTA showed no large vessel occlusion. Given that patient received heparin bolus and PTT >200, tPA was not administered. Initial NIHSS 3, however, when BP was controlled to 150/80, patient was found to have some slight left sided weakness and left sided neglect which matches decreased perfusion area along the right MCA territory. Therefore, it is possible that patient has right MCA stroke.     NEURO  #R/o Right MCA Stroke  Stroke code was called about 15 minutes post cardiac cath due to confusion, difficulty following commands and getting words out. HCT negative. CTA showed no large vessel occlusion. Given that patient received heparin bolus and PTT >200, tPA was not administered. Initial NIHSS 3, however, when BP was controlled to 150/80, patient was found to have some slight left sided weakness and left sided neglect which matches decreased perfusion area along the right MCA territory.  - Closely follow neuro checks every 1 hour  - Repeat HCT for acute changes in neuro exam  - Obtain MRI Brain without contrast  - Obtain TTE with bubble  - Goal -180  - Bedside Dysphagia Screen  - PT/OT/SLP   - Obtain Hemoglobin A1C, Lipid Profile Panel, and TSH    Secondary Stroke Prevention Medication  - continue ASA 81mg & Plavix 75mg - was loaded with ASA 325mg and Plavix 600mg this morning  - Increase atorvastatin to 80mg daily- cholesterol and stroke prevention   - Start heparin SQ and SCDs for DVT prophylaxis     RESPIRATORY  No active issues    CARDIOVASCULAR  #Active CAD  - s/p cardiac cath and had 3 stents placed  - On ASA 81 and plavix 75    #HTN  - on home lisinopril 10 and toprol 100    #HLD  - on home rosuvastatin 10  - c/w atorvastatin 80 inpatient    GI  No active issues    RENAL  No active issues    ENDO  #DM2  - on home metformin 500 BID, januvia 100 qd  - on Novolog 4 at lunch, tresiba 14 daily    ID    HEME-ONC  #h/o breast cancer  - on home anastrazole 1 qd    F: None   E: Replete for K<4 Mag<2  N: Regular Diet  A: HSQ  Code status: full  Dispo: MICU 77 yo F w/ a PMH of HTN, HLD, DM, TIA in 2011 (no residual deficits), PVD, Breast CA s/p chemo/radiation (on Anastrazole) s/p cardiac cath and had 3 stents placed on 3/29.  Stroke code was called about 15 minutes post cardiac cath due to confusion, difficulty following commands and getting words out. HCT negative. CTA showed no large vessel occlusion. Given that patient received heparin bolus and PTT >200, tPA was not administered. Initial NIHSS 3, however, when BP was controlled to 150/80, patient was found to have some slight left sided weakness and left sided neglect which matches decreased perfusion area along the right MCA territory. Therefore, it is possible that patient has right MCA stroke.     NEURO  #R/o Right MCA Stroke  Stroke code was called about 15 minutes post cardiac cath due to confusion, difficulty following commands and getting words out. HCT negative. CTA showed no large vessel occlusion. Given that patient received heparin bolus and PTT >200, tPA was not administered. Initial NIHSS 3, however, when BP was controlled to 150/80, patient was found to have some slight left sided weakness and left sided neglect which matches decreased perfusion area along the right MCA territory.  - Closely follow neuro checks every 1 hour  - Repeat HCT for acute changes in neuro exam  - Obtain MRI Brain without contrast  - Obtain TTE with bubble  - Goal -180  - Bedside Dysphagia Screen  - PT/OT/SLP   - Obtain Hemoglobin A1C, Lipid Profile Panel, and TSH  - continue ASA 81mg & Plavix 75mg - was loaded with ASA 325mg and Plavix 600mg this morning  - Increase atorvastatin to 80mg daily- cholesterol and stroke prevention   - Start heparin SQ and SCDs for DVT prophylaxis     RESPIRATORY  No active issues    CARDIOVASCULAR  #Active CAD  - s/p cardiac cath and had 3 stents placed  - On ASA 81 and plavix 75    #HTN  - on home lisinopril 10 and toprol 100    #HLD  - on home rosuvastatin 10  - c/w atorvastatin 80 inpatient    GI  No active issues    RENAL  No active issues    ENDO  #DM2  - on home metformin 500 BID, januvia 100 qd  - on Novolog 4 at lunch, tresiba 14 daily    ID  No active issues    HEME-ONC  #h/o breast cancer  - on home anastrazole 1 qd    F: None   E: Replete for K<4 Mag<2  N: NPO  A: HSQ  Code status: full  Dispo: MICU 75 yo F w/ a PMH of HTN, HLD, DM, TIA in 2011 (no residual deficits), PVD, Breast CA s/p chemo/radiation (on Anastrazole) s/p cardiac cath and had 3 stents placed on 3/29.  Stroke code was called about 15 minutes post cardiac cath due to confusion, difficulty following commands and getting words out. HCT negative. CTA showed no large vessel occlusion. Given that patient received heparin bolus and PTT >200, tPA was not administered. Initial NIHSS 3, however, when BP was controlled to 150/80, patient was found to have some slight left sided weakness and left sided neglect which matches decreased perfusion area along the right MCA territory. Therefore, it is possible that patient has right MCA stroke.     NEURO  #R/o Right MCA Stroke  Stroke code was called about 15 minutes post cardiac cath due to confusion, difficulty following commands and getting words out. HCT negative. CTA showed no large vessel occlusion. Given that patient received heparin bolus and PTT >200, tPA was not administered. Initial NIHSS 3, however, when BP was controlled to 150/80, patient was found to have some slight left sided weakness and left sided neglect which matches decreased perfusion area along the right MCA territory.  - Closely follow neuro checks every 1 hour  - Repeat HCT for acute changes in neuro exam  - Obtain MRI Brain without contrast  - Obtain TTE with bubble  - Goal -180  - Bedside Dysphagia Screen  - PT/OT/SLP   - Obtain Hemoglobin A1C, Lipid Profile Panel, and TSH  - continue ASA 81mg & Plavix 75mg - was loaded with ASA 325mg and Plavix 600mg this morning  - Increase atorvastatin to 80mg daily- cholesterol and stroke prevention   - Start heparin SQ and SCDs for DVT prophylaxis     RESPIRATORY  No active issues    CARDIOVASCULAR  #Active CAD  - s/p cardiac cath and had 3 stents placed  - On ASA 81 and plavix 75    #Hypertensive emergency  - bp in 240s/120s with wretching and vomiting shortly after arrival to MICU  - given labetalol with good response  - goal sbp 160-180    #HTN  - on home lisinopril 10 and toprol 100    #HLD  - on home rosuvastatin 10  - c/w atorvastatin 80 inpatient    GI  No active issues    RENAL  No active issues    ENDO  #DM2  - on home metformin 500 BID, januvia 100 qd  - on Novolog 4 at lunch, tresiba 14 daily    ID  No active issues    HEME-ONC  #h/o breast cancer  - on home anastrazole 1 qd    F: None   E: Replete for K<4 Mag<2  N: NPO  A: HSQ  Code status: full  [facility]

## 2021-03-29 NOTE — PROGRESS NOTE ADULT - SUBJECTIVE AND OBJECTIVE BOX
PA called to bedside as pt found to be not following commands in cath lab holding area. FS 90s, BP ~200s/100s, rest VVS. Pt A&Ox1 with no focal deficits appreciated, but noted to be confused with some expressive aphasia and intermittent following of commands. IC Fellow Dr Charles and Stroke Code called and pt brought down to CT which reportedly revealed no acute findings. Pt brought back to cath lab holding area with Stroke team. Pt given Narcan 0.4mg IV x 1 and Flumazenil 0.2mg IV x 1 with significant improvement in status. Pt currently A&Ox3, no focal deficits, no expressive aphasia, and following commands appropriately. BP ~220s/100s, given Hydralazine 10mg IV x 1. Will continue to monitor. 5U PA staff and RNs made aware of plan. PA called to bedside as pt found to be not following commands in cath lab holding area. FS 90s, BP ~200s/100s, rest VVS. Pt A&Ox1 with no focal deficits appreciated, but noted to be confused with some expressive aphasia and intermittent following of commands. IC Fellow Dr Charles and Stroke Code called and pt brought down to CT which reportedly revealed no acute findings. Pt brought back to cath lab holding area with Stroke team. Pt given Narcan 0.4mg IV x 1 and Flumazenil 0.2mg IV x 1 with significant improvement in status. Pt currently A&Ox3, no focal deficits, no expressive aphasia, and following commands appropriately. BP ~220s/100s, given Hydralazine 10mg IV x 1. Given Zofran 4mg IV x 1 for nausea. Repeat BP ~190s/100s, per IC Fellow Dr Charles, given Labetalol 10mg IV x 1 as etiology suspected to be 2/2 hypertensive. Stroke team updated and pt will be further evaluated by Dr Lopez. PA called to bedside as pt found to be not following commands in cath lab holding area. FS 90s, BP ~200s/100s, rest VVS. Pt A&Ox1 with no focal deficits appreciated, but noted to be confused with some expressive aphasia and intermittent following of commands. IC Fellow Dr Charles and Stroke Code called and pt brought down to CT which reportedly revealed no acute findings. Pt brought back to cath lab holding area with Stroke team. Pt given Narcan 0.4mg IV x 1 and Flumazenil 0.2mg IV x 1 with significant improvement in status. Pt currently A&Ox3, no focal deficits, no expressive aphasia, and following commands appropriately. BP ~220s/100s, given Hydralazine 10mg IV x 1. Given Zofran 4mg IV x 1 for nausea. Repeat BP ~190s/100s, per IC Fellow Dr Charles, given Labetalol 10mg IV x 1 as etiology suspected to be 2/2 hypertensive. Stroke team updated and pt will be further evaluated by Dr Lopez.    UPDATE: Pt's neuro exam intermittently waxing and waning. Stroked team notified and pt evaluated by Dr Lopez who has determined pt has likely R MCA infarct. No TPA given 2/2 . COAGs sent. Pt will be admitted to MICU under Dr Lopez's service.

## 2021-03-29 NOTE — PROGRESS NOTE ADULT - SUBJECTIVE AND OBJECTIVE BOX
Hospital Course:  75 yo F w/ a PMH of HTN, HLD, DM, TIA in 2011 (no residual deficits), PVD, Breast CA s/p chemo/radiation (on Anastrazole) s/p cardiac cath and had 3 stents placed on 3/29.  Stroke code was called about 15 minutes post cardiac cath due to confusion, difficulty following commands and getting words out. Patient received 1mg of Versed and 25mg of fentanyl during the procedure and had 8,000U of heparin total, most recent bolus was 30 minutes ago. Patient was also noted to be hypertension SBP >200. Stroke code was called. Patient able to say name, however, difficult to name some objects such as color blue, fingers, phone. No other focal deficits at the time. Initial NIHSS 3.  Patient was given narcan and fluoxetine due to possible confusion from narcotics. Patient appeared to name more objects and participate in exam more at this time.     Patient was then later examined after given hydralazine and labetalol to control BP and now had some mild left sided neglect, left arm and leg 4/5. BP at the time was 150/80. Patient able to follow some commands and name some objects.     Unable to obtain ROS from patient.     OVERNIGHT EVENTS:    SUBJECTIVE / INTERVAL HPI: Patient seen and examined at bedside.     VITAL SIGNS:  Vital Signs Last 24 Hrs  T(C): 37.3 (29 Mar 2021 15:00), Max: 37.3 (29 Mar 2021 15:00)  T(F): 99.1 (29 Mar 2021 15:00), Max: 99.1 (29 Mar 2021 15:00)  HR: 103 (29 Mar 2021 15:00) (98 - 103)  BP: 165/99 (29 Mar 2021 15:00) (150/111 - 165/99)  BP(mean): 124 (29 Mar 2021 15:00) (121 - 124)  RR: 27 (29 Mar 2021 15:00) (14 - 27)  SpO2: 99% (29 Mar 2021 15:00) (99% - 99%)  I&O's Summary      PHYSICAL EXAM:    General: lethargic, opens eyes to voice  HEENT: NC/AT; PERRL, anicteric sclera; MMM  Neck: supple  Cardiovascular: +S1/S2; RRR  Respiratory: CTA B/L; no W/R/R  Gastrointestinal: soft, NT/ND; +BSx4  Extremities: WWP; no edema, clubbing or cyanosis  Vascular: 2+ radial, DP/PT pulses B/L  Neurologic:  -Mental status: lethargic, oriented to person. Does not state time, location or year. able to follow some commands. able to name some objection such as fingers, could not name blue, phone, ID badge.   -Cranial nerves:   II: Visual fields are full to confrontation.  III, IV, VI: Extraocular movements are intact without nystagmus. Pupils equally round and reactive to light  VII: Face is symmetric with normal eye closure and smile  Motor: Normal bulk and tone. No pronator drift. Strength bilateral upper extremity 5/5, bilateral lower extremities 5/5.  Sensation: Intact to light touch bilaterally. No neglect or extinction on double simultaneous testing.  Coordination: No dysmetria on finger-to-nose bilaterally  Reflexes: Downgoing toes bilaterally     Initial NIHSS: 3 for mental status, commands, naming  ASPECTS Score: 8    NEURO EXAM after BP was lowered to 150/80   - about 1 hour after stroke code was called  -Mental status: lethargic, oriented to person and hospital. able to follow some commands  -Cranial nerves:   III, IV, VI: Extraocular movements are intact without nystagmus. Pupils equally round and reactive to light  VII: Face is symmetric with normal eye closure and smile  Motor: Normal bulk and tone. No pronator drift. left upper extremity 4/5, left lower extremity 4/5. right upper and lower extremity 5/5.   Sensation: Intact to light touch bilaterally. some visual neglect to the left side     MEDICATIONS:  MEDICATIONS  (STANDING):  anastrozole 1 milliGRAM(s) Oral daily  atorvastatin 80 milliGRAM(s) Oral at bedtime  dextrose 40% Gel 15 Gram(s) Oral Once  dextrose 5%. 1000 milliLiter(s) (50 mL/Hr) IV Continuous <Continuous>  dextrose 5%. 1000 milliLiter(s) (100 mL/Hr) IV Continuous <Continuous>  dextrose 50% Injectable 25 Gram(s) IV Push Once  dextrose 50% Injectable 12.5 Gram(s) IV Push Once  dextrose 50% Injectable 25 Gram(s) IV Push Once  glucagon  Injectable 1 milliGRAM(s) IntraMuscular Once  insulin lispro (ADMELOG) corrective regimen sliding scale   SubCutaneous Before meals and at bedtime  insulin lispro Injectable (ADMELOG) 3 Unit(s) SubCutaneous three times a day with meals  naloxone Injectable 0.4 milliGRAM(s) IV Push once  sodium chloride 0.9% Bolus 250 milliLiter(s) IV Bolus once  sodium chloride 0.9%. 500 milliLiter(s) (75 mL/Hr) IV Continuous <Continuous>    MEDICATIONS  (PRN):      ALLERGIES:  Allergies    NSAIDs (Swelling)    Intolerances    codeine (Vomiting)      LABS:                        11.1   5.82  )-----------( 168      ( 29 Mar 2021 07:48 )             35.6     03-29    143  |  104  |  18  ----------------------------<  74  4.3   |  28  |  0.78    Ca    9.0      29 Mar 2021 07:48    TPro  6.8  /  Alb  3.9  /  TBili  0.3  /  DBili  x   /  AST  18  /  ALT  12  /  AlkPhos  125<H>  03-29    PT/INR - ( 29 Mar 2021 13:25 )   PT: 12.9 sec;   INR: 1.08          PTT - ( 29 Mar 2021 13:25 )  PTT:>200.0 sec    CAPILLARY BLOOD GLUCOSE  160 (29 Mar 2021 13:49)      POCT Blood Glucose.: 160 mg/dL (29 Mar 2021 13:37)      RADIOLOGY & ADDITIONAL TESTS: Reviewed.    HCT:  CT Brain Stroke Protocol (03.29.21 @ 11:13) >  IMPRESSION:    No intracranial mass effect or CT evidence of recent transcortical infarct. No acute parenchymal hemorrhage. No definite acute intracranial hemorrhage, given some limitation by the presence of residual intravascular contrast within the subarachnoid space.    CTA:  < from: CT Angio Neck w/ IV Cont (03.29.21 @ 11:15) >  IMPRESSION: Limited exam. No large vessel occlusion.    CT Angio Neck w/ IV Cont (03.29.21 @ 11:15) >    IMPRESSION: Limited exam. Mild right carotid stenosis    CTP:    CT Perfusion w/ Maps w/ IV Cont (03.29.21 @ 11:14) >    IMPRESSION: Markedly limited perfusion study with mismatch volume of 91 ml and infinite mismatch ratio.

## 2021-03-29 NOTE — CONSULT NOTE ADULT - ATTENDING COMMENTS
Initial attending contact date  3/29/21    . See fellow note written above for details. I reviewed the fellow documentation. I have personally seen and examined this patient. I reviewed vitals, labs, medications, cardiac studies, and additional imaging. I agree with the above fellow's findings and plans as written above with the following additions/statements.      76 F w/ hx of CAD, HTN, DM, HLD, TIA (2011), PVD, Breat CA (s/p chemo/radiation) admitted to stroke ICU for acute CVA post cardiac catheterization s/p ABDOULAYE mLAD, OM2, pRCA. Cardiology consulted for continued management of coronary artery disease.     -CAD status stable: cont ASA/plavix/statin  -CVA in distribution of R MCA: currently hypertensive with . Would give hydralazine 10 mg IV prn SBP >180  -Neuro following  -ECHO with bubble study  -Will cont to follow

## 2021-03-30 LAB
ANION GAP SERPL CALC-SCNC: 10 MMOL/L — SIGNIFICANT CHANGE UP (ref 5–17)
APTT BLD: 32.6 SEC — SIGNIFICANT CHANGE UP (ref 27.5–35.5)
BASOPHILS # BLD AUTO: 0.01 K/UL — SIGNIFICANT CHANGE UP (ref 0–0.2)
BASOPHILS NFR BLD AUTO: 0.1 % — SIGNIFICANT CHANGE UP (ref 0–2)
BUN SERPL-MCNC: 16 MG/DL — SIGNIFICANT CHANGE UP (ref 7–23)
CALCIUM SERPL-MCNC: 8.2 MG/DL — LOW (ref 8.4–10.5)
CHLORIDE SERPL-SCNC: 103 MMOL/L — SIGNIFICANT CHANGE UP (ref 96–108)
CO2 SERPL-SCNC: 26 MMOL/L — SIGNIFICANT CHANGE UP (ref 22–31)
CREAT SERPL-MCNC: 0.94 MG/DL — SIGNIFICANT CHANGE UP (ref 0.5–1.3)
EOSINOPHIL # BLD AUTO: 0 K/UL — SIGNIFICANT CHANGE UP (ref 0–0.5)
EOSINOPHIL NFR BLD AUTO: 0 % — SIGNIFICANT CHANGE UP (ref 0–6)
GLUCOSE BLDC GLUCOMTR-MCNC: 144 MG/DL — HIGH (ref 70–99)
GLUCOSE BLDC GLUCOMTR-MCNC: 195 MG/DL — HIGH (ref 70–99)
GLUCOSE BLDC GLUCOMTR-MCNC: 197 MG/DL — HIGH (ref 70–99)
GLUCOSE BLDC GLUCOMTR-MCNC: 202 MG/DL — HIGH (ref 70–99)
GLUCOSE SERPL-MCNC: 177 MG/DL — HIGH (ref 70–99)
HCT VFR BLD CALC: 32 % — LOW (ref 34.5–45)
HGB BLD-MCNC: 10.3 G/DL — LOW (ref 11.5–15.5)
IMM GRANULOCYTES NFR BLD AUTO: 0.4 % — SIGNIFICANT CHANGE UP (ref 0–1.5)
INR BLD: 1.18 — HIGH (ref 0.88–1.16)
LACTATE SERPL-SCNC: 1.1 MMOL/L — SIGNIFICANT CHANGE UP (ref 0.5–2)
LYMPHOCYTES # BLD AUTO: 0.74 K/UL — LOW (ref 1–3.3)
LYMPHOCYTES # BLD AUTO: 8 % — LOW (ref 13–44)
MAGNESIUM SERPL-MCNC: 1.3 MG/DL — LOW (ref 1.6–2.6)
MCHC RBC-ENTMCNC: 29.9 PG — SIGNIFICANT CHANGE UP (ref 27–34)
MCHC RBC-ENTMCNC: 32.2 GM/DL — SIGNIFICANT CHANGE UP (ref 32–36)
MCV RBC AUTO: 93 FL — SIGNIFICANT CHANGE UP (ref 80–100)
MONOCYTES # BLD AUTO: 0.25 K/UL — SIGNIFICANT CHANGE UP (ref 0–0.9)
MONOCYTES NFR BLD AUTO: 2.7 % — SIGNIFICANT CHANGE UP (ref 2–14)
NEUTROPHILS # BLD AUTO: 8.22 K/UL — HIGH (ref 1.8–7.4)
NEUTROPHILS NFR BLD AUTO: 88.8 % — HIGH (ref 43–77)
NRBC # BLD: 0 /100 WBCS — SIGNIFICANT CHANGE UP (ref 0–0)
PHOSPHATE SERPL-MCNC: 4.3 MG/DL — SIGNIFICANT CHANGE UP (ref 2.5–4.5)
PLATELET # BLD AUTO: 156 K/UL — SIGNIFICANT CHANGE UP (ref 150–400)
POTASSIUM SERPL-MCNC: 3.5 MMOL/L — SIGNIFICANT CHANGE UP (ref 3.5–5.3)
POTASSIUM SERPL-SCNC: 3.5 MMOL/L — SIGNIFICANT CHANGE UP (ref 3.5–5.3)
PROTHROM AB SERPL-ACNC: 14 SEC — HIGH (ref 10.6–13.6)
RBC # BLD: 3.44 M/UL — LOW (ref 3.8–5.2)
RBC # FLD: 14 % — SIGNIFICANT CHANGE UP (ref 10.3–14.5)
SODIUM SERPL-SCNC: 139 MMOL/L — SIGNIFICANT CHANGE UP (ref 135–145)
TSH SERPL-MCNC: 0.59 UIU/ML — SIGNIFICANT CHANGE UP (ref 0.35–4.94)
WBC # BLD: 9.26 K/UL — SIGNIFICANT CHANGE UP (ref 3.8–10.5)
WBC # FLD AUTO: 9.26 K/UL — SIGNIFICANT CHANGE UP (ref 3.8–10.5)

## 2021-03-30 PROCEDURE — 36000 PLACE NEEDLE IN VEIN: CPT

## 2021-03-30 PROCEDURE — 93306 TTE W/DOPPLER COMPLETE: CPT | Mod: 26

## 2021-03-30 PROCEDURE — 99233 SBSQ HOSP IP/OBS HIGH 50: CPT

## 2021-03-30 PROCEDURE — 76937 US GUIDE VASCULAR ACCESS: CPT | Mod: 26

## 2021-03-30 RX ORDER — ONDANSETRON 8 MG/1
4 TABLET, FILM COATED ORAL EVERY 6 HOURS
Refills: 0 | Status: DISCONTINUED | OUTPATIENT
Start: 2021-03-30 | End: 2021-04-02

## 2021-03-30 RX ORDER — METOPROLOL TARTRATE 50 MG
25 TABLET ORAL EVERY 12 HOURS
Refills: 0 | Status: DISCONTINUED | OUTPATIENT
Start: 2021-03-30 | End: 2021-04-01

## 2021-03-30 RX ORDER — SODIUM CHLORIDE 9 MG/ML
500 INJECTION INTRAMUSCULAR; INTRAVENOUS; SUBCUTANEOUS ONCE
Refills: 0 | Status: COMPLETED | OUTPATIENT
Start: 2021-03-30 | End: 2021-03-30

## 2021-03-30 RX ORDER — POTASSIUM CHLORIDE 20 MEQ
40 PACKET (EA) ORAL ONCE
Refills: 0 | Status: COMPLETED | OUTPATIENT
Start: 2021-03-30 | End: 2021-03-30

## 2021-03-30 RX ORDER — CHLORHEXIDINE GLUCONATE 213 G/1000ML
1 SOLUTION TOPICAL
Refills: 0 | Status: DISCONTINUED | OUTPATIENT
Start: 2021-03-30 | End: 2021-04-02

## 2021-03-30 RX ORDER — CEFTRIAXONE 500 MG/1
1000 INJECTION, POWDER, FOR SOLUTION INTRAMUSCULAR; INTRAVENOUS EVERY 24 HOURS
Refills: 0 | Status: DISCONTINUED | OUTPATIENT
Start: 2021-03-30 | End: 2021-03-30

## 2021-03-30 RX ORDER — METOPROLOL TARTRATE 50 MG
25 TABLET ORAL EVERY 12 HOURS
Refills: 0 | Status: DISCONTINUED | OUTPATIENT
Start: 2021-03-30 | End: 2021-03-30

## 2021-03-30 RX ORDER — PHENYLEPHRINE HYDROCHLORIDE 10 MG/ML
0.3 INJECTION INTRAVENOUS
Qty: 40 | Refills: 0 | Status: DISCONTINUED | OUTPATIENT
Start: 2021-03-30 | End: 2021-03-30

## 2021-03-30 RX ORDER — MAGNESIUM SULFATE 500 MG/ML
2 VIAL (ML) INJECTION ONCE
Refills: 0 | Status: COMPLETED | OUTPATIENT
Start: 2021-03-30 | End: 2021-03-30

## 2021-03-30 RX ORDER — ACETAMINOPHEN 500 MG
650 TABLET ORAL ONCE
Refills: 0 | Status: COMPLETED | OUTPATIENT
Start: 2021-03-30 | End: 2021-03-30

## 2021-03-30 RX ORDER — PHENYLEPHRINE HYDROCHLORIDE 10 MG/ML
0.3 INJECTION INTRAVENOUS
Qty: 40 | Refills: 0 | Status: DISCONTINUED | OUTPATIENT
Start: 2021-03-30 | End: 2021-03-31

## 2021-03-30 RX ORDER — LABETALOL HCL 100 MG
5 TABLET ORAL ONCE
Refills: 0 | Status: COMPLETED | OUTPATIENT
Start: 2021-03-30 | End: 2021-03-30

## 2021-03-30 RX ADMIN — Medication 40 MILLIEQUIVALENT(S): at 07:57

## 2021-03-30 RX ADMIN — Medication 650 MILLIGRAM(S): at 13:14

## 2021-03-30 RX ADMIN — SODIUM CHLORIDE 2000 MILLILITER(S): 9 INJECTION INTRAMUSCULAR; INTRAVENOUS; SUBCUTANEOUS at 00:37

## 2021-03-30 RX ADMIN — Medication 650 MILLIGRAM(S): at 09:44

## 2021-03-30 RX ADMIN — INSULIN GLARGINE 9 UNIT(S): 100 INJECTION, SOLUTION SUBCUTANEOUS at 07:47

## 2021-03-30 RX ADMIN — ANASTROZOLE 1 MILLIGRAM(S): 1 TABLET ORAL at 16:09

## 2021-03-30 RX ADMIN — CEFTRIAXONE 100 MILLIGRAM(S): 500 INJECTION, POWDER, FOR SOLUTION INTRAMUSCULAR; INTRAVENOUS at 11:55

## 2021-03-30 RX ADMIN — Medication 50 GRAM(S): at 07:05

## 2021-03-30 RX ADMIN — Medication 3 UNIT(S): at 06:10

## 2021-03-30 RX ADMIN — CLOPIDOGREL BISULFATE 75 MILLIGRAM(S): 75 TABLET, FILM COATED ORAL at 16:09

## 2021-03-30 RX ADMIN — Medication 3 UNIT(S): at 11:57

## 2021-03-30 RX ADMIN — Medication 81 MILLIGRAM(S): at 16:08

## 2021-03-30 RX ADMIN — HEPARIN SODIUM 5000 UNIT(S): 5000 INJECTION INTRAVENOUS; SUBCUTANEOUS at 06:07

## 2021-03-30 RX ADMIN — Medication 5 MILLIGRAM(S): at 13:29

## 2021-03-30 RX ADMIN — HEPARIN SODIUM 5000 UNIT(S): 5000 INJECTION INTRAVENOUS; SUBCUTANEOUS at 21:27

## 2021-03-30 RX ADMIN — PHENYLEPHRINE HYDROCHLORIDE 7.4 MICROGRAM(S)/KG/MIN: 10 INJECTION INTRAVENOUS at 03:55

## 2021-03-30 RX ADMIN — Medication 81 MILLIGRAM(S): at 16:09

## 2021-03-30 RX ADMIN — PHENYLEPHRINE HYDROCHLORIDE 7.4 MICROGRAM(S)/KG/MIN: 10 INJECTION INTRAVENOUS at 18:21

## 2021-03-30 RX ADMIN — Medication 2: at 21:35

## 2021-03-30 RX ADMIN — HEPARIN SODIUM 5000 UNIT(S): 5000 INJECTION INTRAVENOUS; SUBCUTANEOUS at 13:29

## 2021-03-30 RX ADMIN — Medication 260 MILLIGRAM(S): at 12:46

## 2021-03-30 RX ADMIN — ATORVASTATIN CALCIUM 80 MILLIGRAM(S): 80 TABLET, FILM COATED ORAL at 21:27

## 2021-03-30 RX ADMIN — Medication 1: at 06:07

## 2021-03-30 RX ADMIN — Medication 1: at 11:56

## 2021-03-30 RX ADMIN — ATORVASTATIN CALCIUM 80 MILLIGRAM(S): 80 TABLET, FILM COATED ORAL at 05:29

## 2021-03-30 RX ADMIN — ONDANSETRON 4 MILLIGRAM(S): 8 TABLET, FILM COATED ORAL at 12:31

## 2021-03-30 RX ADMIN — Medication 650 MILLIGRAM(S): at 09:48

## 2021-03-30 NOTE — CONSULT NOTE ADULT - SUBJECTIVE AND OBJECTIVE BOX
Patient is a 75y old  Female who presents with a chief complaint of      HPI:  Cardiologist: Dr. Juan Manuel Irvin  Escort: Daughter  Pharamcy: *Berjade Patient*  COVID: NEGATIVE 3/26 @ [facility] in [facility]  *Of note traveled from [facility] on 3/22 and quarantined x 7 days*  *Hx obtained via pts daughter Onur*    75 yo F w/ a PMH of HTN, HLD, DM, TIA in  (no residual deficits), PVD, Breast CA s/p chemo/radiation (on Anastrazole) who presented to outpatient cardiologist Dr. Juan Manuel Irvin for routine follow up. Pt states she is totally ASX. Denies CP, SOB, dizziness, diaphoresis, palpitations, orthopnea/PND, abdominal pain, N/V/D, urinary sx, BRBPR, hematuria, melena, LE swelling, recent travel/sick contacts/illness. CCTA 21: Ca score 1848 (90th percentile). Of note: remainder of CCTA had to be aborted because BP was too high. In light of patient’s risk factors and abnormal calcium score, patient is referred for cardiac catheterization with possible intervention if clinically indicated.    (25 Mar 2021 16:11)      PAST MEDICAL & SURGICAL HISTORY:  HTN (hypertension)    HLD (hyperlipidemia)    DM (diabetes mellitus)    Breast cancer    H/O:     S/P appendectomy    S/P dilation and curettage    H/O lumpectomy        MEDICATIONS  (STANDING):  anastrozole 1 milliGRAM(s) Oral daily  aspirin enteric coated 81 milliGRAM(s) Oral daily  atorvastatin 80 milliGRAM(s) Oral at bedtime  cefTRIAXone   IVPB 1000 milliGRAM(s) IV Intermittent every 24 hours  chlorhexidine 2% Cloths 1 Application(s) Topical <User Schedule>  clopidogrel Tablet 75 milliGRAM(s) Oral daily  dextrose 40% Gel 15 Gram(s) Oral Once  dextrose 5%. 1000 milliLiter(s) (50 mL/Hr) IV Continuous <Continuous>  dextrose 5%. 1000 milliLiter(s) (100 mL/Hr) IV Continuous <Continuous>  dextrose 50% Injectable 25 Gram(s) IV Push Once  dextrose 50% Injectable 12.5 Gram(s) IV Push Once  dextrose 50% Injectable 25 Gram(s) IV Push Once  glucagon  Injectable 1 milliGRAM(s) IntraMuscular Once  heparin   Injectable 5000 Unit(s) SubCutaneous every 8 hours  insulin glargine Injectable (LANTUS) 9 Unit(s) SubCutaneous every morning  insulin lispro (ADMELOG) corrective regimen sliding scale   SubCutaneous Before meals and at bedtime  insulin lispro Injectable (ADMELOG) 3 Unit(s) SubCutaneous three times a day with meals  metoprolol succinate  milliGRAM(s) Oral daily  naloxone Injectable 0.4 milliGRAM(s) IV Push once  phenylephrine    Infusion 0.3 MICROgram(s)/kG/Min (7.4 mL/Hr) IV Continuous <Continuous>    MEDICATIONS  (PRN):  acetaminophen   Tablet .. 650 milliGRAM(s) Oral every 6 hours PRN Temp greater or equal to 38C (100.4F)        Social History:               -  Home Living Status:  lives with her  in a private house > 10 steps to enter         -  Prior Home Care Services:   X    Baseline Functional Level Prior to Admission:             - ADL's/ IADL's:    patient states she is independent         - ambulatory status PTA:  walked without assistive devices    FAMILY HISTORY:  Family history of early CAD  stent in sister        CBC Full  -  ( 30 Mar 2021 02:51 )  WBC Count : 9.26 K/uL  RBC Count : 3.44 M/uL  Hemoglobin : 10.3 g/dL  Hematocrit : 32.0 %  Platelet Count - Automated : 156 K/uL  Mean Cell Volume : 93.0 fl  Mean Cell Hemoglobin : 29.9 pg  Mean Cell Hemoglobin Concentration : 32.2 gm/dL  Auto Neutrophil # : 8.22 K/uL  Auto Lymphocyte # : 0.74 K/uL  Auto Monocyte # : 0.25 K/uL  Auto Eosinophil # : 0.00 K/uL  Auto Basophil # : 0.01 K/uL  Auto Neutrophil % : 88.8 %  Auto Lymphocyte % : 8.0 %  Auto Monocyte % : 2.7 %  Auto Eosinophil % : 0.0 %  Auto Basophil % : 0.1 %      03-30    139  |  103  |  16  ----------------------------<  177<H>  3.5   |  26  |  0.94    Ca    8.2<L>      30 Mar 2021 02:50  Phos  4.3     03-30  Mg     1.3     03-30    TPro  7.4  /  Alb  4.0  /  TBili  0.8  /  DBili  x   /  AST  32  /  ALT  13  /  AlkPhos  141<H>        Urinalysis Basic - ( 29 Mar 2021 19:09 )    Color: Yellow / Appearance: OTHER / S.015 / pH: x  Gluc: x / Ketone: 15 mg/dL  / Bili: Negative / Urobili: 0.2 E.U./dL   Blood: x / Protein: >=300 mg/dL / Nitrite: POSITIVE   Leuk Esterase: NEGATIVE / RBC: Many /HPF / WBC < 5 /HPF   Sq Epi: x / Non Sq Epi: 0-5 /HPF / Bacteria: Present /HPF          Radiology:    < from: CT Head No Cont (21 @ 22:28) >    EXAM:  CT BRAIN                          PROCEDURE DATE:  2021          INTERPRETATION:  Mariluz CLEMENS MD, have reviewed the images and the report and agree with the findings.    ******PRELIMINARY REPORT******    PROCEDURE: CT head without intravenous contrast    INDICATIONS: Recent stroke now with decreased mentation. Rule out bleed.    TECHNIQUE:  Serial axial images were obtained from the skull to the vertex without the use of intravenous contrast. Coronal and sagittal reconstructions were created.    COMPARISON EXAMINATION: CT head same day.    FINDINGS:  VENTRICLES AND SULCI: There is age-appropriate parenchymal volume loss. No hydrocephalus.  INTRA-AXIAL: No intracranial mass, acute hemorrhage, or midline shift is present. No acute transcortical infarction. There are mild scattered hypodensities throughout the periventricular white matter, likely the sequela of chronic small vessel ischemic disease. Likely chronic lacunar infarction in the left caudothalamic junction is again noted.  EXTRA-AXIAL: No extra-axial fluid collection is present.  VISUALIZED SINUSES: No air-fluid levels are identified. Mild mucosal thickening left sphenoid sinus.  VISUALIZED MASTOIDS: Well aerated.  CALVARIUM: No fracture.  MISCELLANEOUS: Bilateral ocular replacement noted.    IMPRESSION:    No acute intracranial hemorrhage, mass effect, or territorial infarction.    Age-appropriate volume loss with chronic changes, as above.      < from: CT Abdomen and Pelvis No Cont (21 @ 22:28) >  EXAM:  CT ABDOMEN AND PELVIS                          PROCEDURE DATE:  2021          INTERPRETATION:  CT of the ABDOMEN and PELVIS without intravenous contrast dated 3/29/2021 10:28 PM    INDICATION: Recent stroke now with abdominal pain. Rule out bleed. Recent catheterization.    TECHNIQUE: CT of the abdomen and pelvis without intravenous or oral contrast. Axial, sagittal, and coronal images were obtained and reviewed.    COMPARISON: None.    FINDINGS:    Evaluation of the abdominopelvicviscera is limited due to noncontrast technique.    Lower chest: Clear lung bases. Small pericardial effusion. Dense pericardial fluid measuring 53 Hounsfield units could represent blood products or exudate.    Liver: Smooth contour. No definite focal lesion.    Biliary system: Gallbladder is normal in size. There is vicarious excretion of contrast within the gallbladder lumen from prior contrast administration from earlier in the day. No definite gallstones are visualized. No biliary ductal dilatation.    Pancreas: Unremarkable.    Spleen: Unremarkable.    Adrenal glands: Unremarkable.    Kidneys: No hydronephrosis. No ureteral calculus. 0.2 cm nonobstructing calculus upper pole right kidney. Bilateral perinephric fat infiltration. Bilateralrenal parenchyma enhancement and renal collecting systems filling with contrast material as result of prior contrast-enhanced study.    Bowel/Peritoneum: Normal bowel caliber without evidence of obstruction. No appreciable wall thickening, although evaluation is suboptimal without oral contrast. Normal appendix. No extraluminal gas. Trace pelvic ascites. No retroperitoneal hematoma seen.    Pelvic organs: Large calcified uterine fibroid noted. The ovaries are grossly normal. No filling defects seen within the urinary bladder, which completely fills with contrast from prior angiogram.    Lymph nodes: No lymphadenopathy.    Vascular: Normal caliber aorta. Moderate atherosclerotic calcifications are present within the abdominal aorta and its major branches.    Bones/Soft tissues: No groin hematoma. Mild degenerative changes of spine noted.  Grade 1 degenerative anterolisthesis of L3 on L4.    IMPRESSION:  No retroperitoneal hematoma. No abdominopelvic fluid collection. Small dense pericardial effusion.                  Vital Signs Last 24 Hrs  T(C): 37.1 (30 Mar 2021 05:30), Max: 38.4 (29 Mar 2021 20:00)  T(F): 98.8 (30 Mar 2021 05:30), Max: 101.1 (29 Mar 2021 20:00)  HR: 72 (30 Mar 2021 09:15) (72 - 112)  BP: 119/56 (30 Mar 2021 09:15) (103/54 - 247/134)  BP(mean): 81 (30 Mar 2021 09:15) (75 - 179)  RR: 21 (30 Mar 2021 09:15) (13 - 29)  SpO2: 100% (30 Mar 2021 09:15) (98% - 100%)    REVIEW OF SYSTEMS: as per HPI      Physical Exam:  in MICU, 76 yo AA woman lying in semi Vargas's position, no acute complaints    Head: normocephalic, atraumatic    Eyes: PERRLA, EOMI, no nystagmus, sclera anicteric    ENT: nasal discharge, uvula midline, no oropharyngeal erythema/exudate    Neck: supple, negative JVD, negative carotid bruits, no thyromegaly    Chest: CTA bilaterally, neg wheeze/ rhonchi/ rales/ crackles/ egophany    Cardiovascular: regular rate and rhythm, neg murmurs/rubs/gallops    Abdomen: soft, non distended, non tender to palpation in all 4 quadrants, negative rebound/guarding, normal bowel sounds    Extremities: WWP, neg cyanosis/clubbing/edema, negative calf tenderness to palpation, negative Torri's sign    Musculoskeletal:    Skin:      :     Neurologic Exam:    Alert and oriented to person, place, date/year, speech fluent w/o dysarthria, follows commands, recent and remote memory intact, repetition intact, comprehension intact,  attention/concentration intact, fund of knowledge appropriate    Cranial Nerves:     II:                         pupils equal, round and reactive to light, visual fields ? LUQ defect  III/ IV/VI:             extraocular movements intact, neg nystagmus, neg ptosis  V:                        facial sensation intact, V1-3 normal  VII:                      face symmetric, no droop, normal eye closure and smile  VIII:                     hearing intact to finger rub bilaterally  IX and X:             no hoarseness, gag intact, palate/ uvula rise symmetrically  XI:                       SCM/ trapezius strength intact bilateral  XII:                      no tongue deviation    Motor Exam:    Right UE:            no foal weakness > 4/5                             pronator drift: neg    Left UE:              no foal weakness > 4/5                             pronator drift: neg      Right LE:          no foal weakness > 4/5      Left LE:               no foal weakness > 4/5               Sensation:        Intact to light touch x 4 extremities                           No neglect or extinction on double simultaneous testing.                       DTR:                  biceps/brachioradialis: equal bilaterally                                                   patella/ankle: equal bilaterally                                                    neg clonus                           neg Babinski                        Finger to Nose:  neg dysmetria bilaterally        Heel to shin: wnl bilaterally        Rapid Alternating movements:  neg dysdiadochokinesia bilaterally            Joint Position Sense: wnl bilaterally          R:           L:     Romberg:  not tested    Tandem Walking:  not tested    Gait:  not tested        PM&R Impression:    1) r/o acute stroke  2) no focal weakness    Plan: cleared to begin rehab    1) Physical therapy focusing on therapeutic exercises, bed mobility/transfer out of bed evaluation, progressive ambulation with assistive devices prn.    2) Anticipated Disposition Plan/Recs:    pending functional progress                     Patient is a 75y old  Female who presents with a chief complaint of      HPI:  Cardiologist: Dr. Juan Manuel Irvin  Escort: Daughter  Pharamcy: *Berjade Patient*  COVID: NEGATIVE 3/26 @ [facility] in [facility]  *Of note traveled from [facility] on 3/22 and quarantined x 7 days*  *Hx obtained via pts daughter Onur*    77 yo F w/ a PMH of HTN, HLD, DM, TIA in  (no residual deficits), PVD, Breast CA s/p chemo/radiation (on Anastrazole) who presented to outpatient cardiologist Dr. Juan Manuel Irvin for routine follow up. Pt states she is totally ASX. Denies CP, SOB, dizziness, diaphoresis, palpitations, orthopnea/PND, abdominal pain, N/V/D, urinary sx, BRBPR, hematuria, melena, LE swelling, recent travel/sick contacts/illness. CCTA 21: Ca score 1848 (90th percentile). Of note: remainder of CCTA had to be aborted because BP was too high. In light of patient’s risk factors and abnormal calcium score, patient is referred for cardiac catheterization with possible intervention if clinically indicated.    (25 Mar 2021 16:11)      PAST MEDICAL & SURGICAL HISTORY:  HTN (hypertension)    HLD (hyperlipidemia)    DM (diabetes mellitus)    Breast cancer    H/O:     S/P appendectomy    S/P dilation and curettage    H/O lumpectomy        MEDICATIONS  (STANDING):  anastrozole 1 milliGRAM(s) Oral daily  aspirin enteric coated 81 milliGRAM(s) Oral daily  atorvastatin 80 milliGRAM(s) Oral at bedtime  cefTRIAXone   IVPB 1000 milliGRAM(s) IV Intermittent every 24 hours  chlorhexidine 2% Cloths 1 Application(s) Topical <User Schedule>  clopidogrel Tablet 75 milliGRAM(s) Oral daily  dextrose 40% Gel 15 Gram(s) Oral Once  dextrose 5%. 1000 milliLiter(s) (50 mL/Hr) IV Continuous <Continuous>  dextrose 5%. 1000 milliLiter(s) (100 mL/Hr) IV Continuous <Continuous>  dextrose 50% Injectable 25 Gram(s) IV Push Once  dextrose 50% Injectable 12.5 Gram(s) IV Push Once  dextrose 50% Injectable 25 Gram(s) IV Push Once  glucagon  Injectable 1 milliGRAM(s) IntraMuscular Once  heparin   Injectable 5000 Unit(s) SubCutaneous every 8 hours  insulin glargine Injectable (LANTUS) 9 Unit(s) SubCutaneous every morning  insulin lispro (ADMELOG) corrective regimen sliding scale   SubCutaneous Before meals and at bedtime  insulin lispro Injectable (ADMELOG) 3 Unit(s) SubCutaneous three times a day with meals  metoprolol succinate  milliGRAM(s) Oral daily  naloxone Injectable 0.4 milliGRAM(s) IV Push once  phenylephrine    Infusion 0.3 MICROgram(s)/kG/Min (7.4 mL/Hr) IV Continuous <Continuous>    MEDICATIONS  (PRN):  acetaminophen   Tablet .. 650 milliGRAM(s) Oral every 6 hours PRN Temp greater or equal to 38C (100.4F)        Social History:               -  Home Living Status:  lives with her  in a private house in [facility] > 10 steps to enter         -  Prior Home Care Services:   X    Baseline Functional Level Prior to Admission:             - ADL's/ IADL's:    patient states she is independent         - ambulatory status PTA:  walked without assistive devices    FAMILY HISTORY:  Family history of early CAD  stent in sister        CBC Full  -  ( 30 Mar 2021 02:51 )  WBC Count : 9.26 K/uL  RBC Count : 3.44 M/uL  Hemoglobin : 10.3 g/dL  Hematocrit : 32.0 %  Platelet Count - Automated : 156 K/uL  Mean Cell Volume : 93.0 fl  Mean Cell Hemoglobin : 29.9 pg  Mean Cell Hemoglobin Concentration : 32.2 gm/dL  Auto Neutrophil # : 8.22 K/uL  Auto Lymphocyte # : 0.74 K/uL  Auto Monocyte # : 0.25 K/uL  Auto Eosinophil # : 0.00 K/uL  Auto Basophil # : 0.01 K/uL  Auto Neutrophil % : 88.8 %  Auto Lymphocyte % : 8.0 %  Auto Monocyte % : 2.7 %  Auto Eosinophil % : 0.0 %  Auto Basophil % : 0.1 %      03-30    139  |  103  |  16  ----------------------------<  177<H>  3.5   |  26  |  0.94    Ca    8.2<L>      30 Mar 2021 02:50  Phos  4.3     03-30  Mg     1.3     03-30    TPro  7.4  /  Alb  4.0  /  TBili  0.8  /  DBili  x   /  AST  32  /  ALT  13  /  AlkPhos  141<H>        Urinalysis Basic - ( 29 Mar 2021 19:09 )    Color: Yellow / Appearance: OTHER / S.015 / pH: x  Gluc: x / Ketone: 15 mg/dL  / Bili: Negative / Urobili: 0.2 E.U./dL   Blood: x / Protein: >=300 mg/dL / Nitrite: POSITIVE   Leuk Esterase: NEGATIVE / RBC: Many /HPF / WBC < 5 /HPF   Sq Epi: x / Non Sq Epi: 0-5 /HPF / Bacteria: Present /HPF          Radiology:    < from: CT Head No Cont (21 @ 22:28) >    EXAM:  CT BRAIN                          PROCEDURE DATE:  2021          INTERPRETATION:  Mariluz CLEMENS MD, have reviewed the images and the report and agree with the findings.    ******PRELIMINARY REPORT******    PROCEDURE: CT head without intravenous contrast    INDICATIONS: Recent stroke now with decreased mentation. Rule out bleed.    TECHNIQUE:  Serial axial images were obtained from the skull to the vertex without the use of intravenous contrast. Coronal and sagittal reconstructions were created.    COMPARISON EXAMINATION: CT head same day.    FINDINGS:  VENTRICLES AND SULCI: There is age-appropriate parenchymal volume loss. No hydrocephalus.  INTRA-AXIAL: No intracranial mass, acute hemorrhage, or midline shift is present. No acute transcortical infarction. There are mild scattered hypodensities throughout the periventricular white matter, likely the sequela of chronic small vessel ischemic disease. Likely chronic lacunar infarction in the left caudothalamic junction is again noted.  EXTRA-AXIAL: No extra-axial fluid collection is present.  VISUALIZED SINUSES: No air-fluid levels are identified. Mild mucosal thickening left sphenoid sinus.  VISUALIZED MASTOIDS: Well aerated.  CALVARIUM: No fracture.  MISCELLANEOUS: Bilateral ocular replacement noted.    IMPRESSION:    No acute intracranial hemorrhage, mass effect, or territorial infarction.    Age-appropriate volume loss with chronic changes, as above.      < from: CT Abdomen and Pelvis No Cont (21 @ 22:28) >  EXAM:  CT ABDOMEN AND PELVIS                          PROCEDURE DATE:  2021          INTERPRETATION:  CT of the ABDOMEN and PELVIS without intravenous contrast dated 3/29/2021 10:28 PM    INDICATION: Recent stroke now with abdominal pain. Rule out bleed. Recent catheterization.    TECHNIQUE: CT of the abdomen and pelvis without intravenous or oral contrast. Axial, sagittal, and coronal images were obtained and reviewed.    COMPARISON: None.    FINDINGS:    Evaluation of the abdominopelvicviscera is limited due to noncontrast technique.    Lower chest: Clear lung bases. Small pericardial effusion. Dense pericardial fluid measuring 53 Hounsfield units could represent blood products or exudate.    Liver: Smooth contour. No definite focal lesion.    Biliary system: Gallbladder is normal in size. There is vicarious excretion of contrast within the gallbladder lumen from prior contrast administration from earlier in the day. No definite gallstones are visualized. No biliary ductal dilatation.    Pancreas: Unremarkable.    Spleen: Unremarkable.    Adrenal glands: Unremarkable.    Kidneys: No hydronephrosis. No ureteral calculus. 0.2 cm nonobstructing calculus upper pole right kidney. Bilateral perinephric fat infiltration. Bilateralrenal parenchyma enhancement and renal collecting systems filling with contrast material as result of prior contrast-enhanced study.    Bowel/Peritoneum: Normal bowel caliber without evidence of obstruction. No appreciable wall thickening, although evaluation is suboptimal without oral contrast. Normal appendix. No extraluminal gas. Trace pelvic ascites. No retroperitoneal hematoma seen.    Pelvic organs: Large calcified uterine fibroid noted. The ovaries are grossly normal. No filling defects seen within the urinary bladder, which completely fills with contrast from prior angiogram.    Lymph nodes: No lymphadenopathy.    Vascular: Normal caliber aorta. Moderate atherosclerotic calcifications are present within the abdominal aorta and its major branches.    Bones/Soft tissues: No groin hematoma. Mild degenerative changes of spine noted.  Grade 1 degenerative anterolisthesis of L3 on L4.    IMPRESSION:  No retroperitoneal hematoma. No abdominopelvic fluid collection. Small dense pericardial effusion.                  Vital Signs Last 24 Hrs  T(C): 37.1 (30 Mar 2021 05:30), Max: 38.4 (29 Mar 2021 20:00)  T(F): 98.8 (30 Mar 2021 05:30), Max: 101.1 (29 Mar 2021 20:00)  HR: 72 (30 Mar 2021 09:15) (72 - 112)  BP: 119/56 (30 Mar 2021 09:15) (103/54 - 247/134)  BP(mean): 81 (30 Mar 2021 09:15) (75 - 179)  RR: 21 (30 Mar 2021 09:15) (13 - 29)  SpO2: 100% (30 Mar 2021 09:15) (98% - 100%)    REVIEW OF SYSTEMS: as per HPI      Physical Exam:  in MICU, 76 yo AA woman lying in semi Vargas's position, no acute complaints    Head: normocephalic, atraumatic    Eyes: PERRLA, EOMI, no nystagmus, sclera anicteric    ENT: nasal discharge, uvula midline, no oropharyngeal erythema/exudate    Neck: supple, negative JVD, negative carotid bruits, no thyromegaly    Chest: CTA bilaterally, neg wheeze/ rhonchi/ rales/ crackles/ egophany    Cardiovascular: regular rate and rhythm, neg murmurs/rubs/gallops    Abdomen: soft, non distended, non tender to palpation in all 4 quadrants, negative rebound/guarding, normal bowel sounds    Extremities: WWP, neg cyanosis/clubbing/edema, negative calf tenderness to palpation, negative Torri's sign    Musculoskeletal:    Skin:      :     Neurologic Exam:    Alert and oriented to person, place, date/year, speech fluent w/o dysarthria, follows commands, recent and remote memory intact, repetition intact, comprehension intact,  attention/concentration intact, fund of knowledge appropriate    Cranial Nerves:     II:                         pupils equal, round and reactive to light, visual fields ? LUQ defect  III/ IV/VI:             extraocular movements intact, neg nystagmus, neg ptosis  V:                        facial sensation intact, V1-3 normal  VII:                      face symmetric, no droop, normal eye closure and smile  VIII:                     hearing intact to finger rub bilaterally  IX and X:             no hoarseness, gag intact, palate/ uvula rise symmetrically  XI:                       SCM/ trapezius strength intact bilateral  XII:                      no tongue deviation    Motor Exam:    Right UE:            no foal weakness > 4/5                             pronator drift: neg    Left UE:              no foal weakness > 4/5                             pronator drift: neg      Right LE:          no foal weakness > 4/5      Left LE:               no foal weakness > 4/5               Sensation:        Intact to light touch x 4 extremities                           No neglect or extinction on double simultaneous testing.                       DTR:                  biceps/brachioradialis: equal bilaterally                                                   patella/ankle: equal bilaterally                                                    neg clonus                           neg Babinski                        Finger to Nose:  neg dysmetria bilaterally        Heel to shin: wnl bilaterally        Rapid Alternating movements:  neg dysdiadochokinesia bilaterally            Joint Position Sense: wnl bilaterally          R:           L:     Romberg:  not tested    Tandem Walking:  not tested    Gait:  not tested        PM&R Impression:    1) r/o acute stroke  2) no focal weakness    Plan: cleared to begin rehab    1) Physical therapy focusing on therapeutic exercises, bed mobility/transfer out of bed evaluation, progressive ambulation with assistive devices prn.    2) Anticipated Disposition Plan/Recs:    pending functional progress

## 2021-03-30 NOTE — OCCUPATIONAL THERAPY INITIAL EVALUATION ADULT - PERTINENT HX OF CURRENT PROBLEM, REHAB EVAL
76 yo F s/p cardiac cath and had 3 stents placed on 3/29.  Stroke code on 3/29 was called about 15 minutes post cardiac cath due to confusion, difficulty following commands and getting words out. HCT negative. CTA showed no large vessel occlusion. when BP was controlled to 150/80, patient was found to have some slight left sided weakness and left sided neglect which matches decreased perfusion area along the right MCA territory.

## 2021-03-30 NOTE — OCCUPATIONAL THERAPY INITIAL EVALUATION ADULT - LIVES WITH, PROFILE
Pt lives with spouse in private house with 8 steps to enter (no HR) and no stairs inside. Pt at baseline is ind for ADLs and functional mobility. Pt reports her daughter lives downstairs if she needs any help. +shower/tub./spouse

## 2021-03-30 NOTE — PHYSICAL THERAPY INITIAL EVALUATION ADULT - CRITERIA FOR SKILLED THERAPEUTIC INTERVENTIONS
impairments found/therapy frequency/predicted duration of therapy intervention/anticipated discharge recommendation

## 2021-03-30 NOTE — PROGRESS NOTE ADULT - SUBJECTIVE AND OBJECTIVE BOX
INTERVAL HPI/OVERNIGHT EVENTS:    SUBJECTIVE: Patient seen and examined at bedside.    OBJECTIVE:    VITAL SIGNS:  ICU Vital Signs Last 24 Hrs  T(C): 37 (30 Mar 2021 10:06), Max: 38.4 (29 Mar 2021 20:00)  T(F): 98.6 (30 Mar 2021 10:06), Max: 101.1 (29 Mar 2021 20:00)  HR: 72 (30 Mar 2021 10:00) (70 - 112)  BP: 166/75 (30 Mar 2021 10:00) (103/54 - 247/134)  BP(mean): 108 (30 Mar 2021 10:00) (75 - 179)  ABP: --  ABP(mean): --  RR: 13 (30 Mar 2021 10:00) (13 - 29)  SpO2: 100% (30 Mar 2021 10:00) (98% - 100%)         @ 07:01  -   @ 07:00  --------------------------------------------------------  IN: 1999.8 mL / OUT: 1050 mL / NET: 949.8 mL     @ 07:01  -   @ 12:22  --------------------------------------------------------  IN: 130 mL / OUT: 0 mL / NET: 130 mL      CAPILLARY BLOOD GLUCOSE  160 (29 Mar 2021 13:49)      POCT Blood Glucose.: 197 mg/dL (30 Mar 2021 11:43)      PHYSICAL EXAM:    Constitutional: NAD  HEENT: NC/AT; PERRL, anicteric sclera; MMM  Neck: supple, no JVD  Cardiovascular: +S1/S2, RRR  Respiratory: CTA B/L, no W/R/R  Gastrointestinal: abdomen soft, NT/ND; no rebound or guarding; +BSx4  Genitourinary: no suprapubic tenderness or fullness  Extremities: WWP; no LE edema; no clubbing or cyanosis  Vascular: 2+ radial, DP/PT and femoral pulses B/L  Dermatologic: normal color and turgor; no visible rashes  Neurological:     MEDICATIONS:  MEDICATIONS  (STANDING):  anastrozole 1 milliGRAM(s) Oral daily  aspirin enteric coated 81 milliGRAM(s) Oral daily  atorvastatin 80 milliGRAM(s) Oral at bedtime  cefTRIAXone   IVPB 1000 milliGRAM(s) IV Intermittent every 24 hours  chlorhexidine 2% Cloths 1 Application(s) Topical <User Schedule>  clopidogrel Tablet 75 milliGRAM(s) Oral daily  dextrose 40% Gel 15 Gram(s) Oral Once  dextrose 5%. 1000 milliLiter(s) (50 mL/Hr) IV Continuous <Continuous>  dextrose 5%. 1000 milliLiter(s) (100 mL/Hr) IV Continuous <Continuous>  dextrose 50% Injectable 25 Gram(s) IV Push Once  dextrose 50% Injectable 12.5 Gram(s) IV Push Once  dextrose 50% Injectable 25 Gram(s) IV Push Once  glucagon  Injectable 1 milliGRAM(s) IntraMuscular Once  heparin   Injectable 5000 Unit(s) SubCutaneous every 8 hours  insulin glargine Injectable (LANTUS) 9 Unit(s) SubCutaneous every morning  insulin lispro (ADMELOG) corrective regimen sliding scale   SubCutaneous Before meals and at bedtime  insulin lispro Injectable (ADMELOG) 3 Unit(s) SubCutaneous three times a day with meals  phenylephrine    Infusion 0.3 MICROgram(s)/kG/Min (7.4 mL/Hr) IV Continuous <Continuous>    MEDICATIONS  (PRN):  acetaminophen   Tablet .. 650 milliGRAM(s) Oral every 6 hours PRN Temp greater or equal to 38C (100.4F)      ALLERGIES:  Allergies    NSAIDs (Swelling)    Intolerances    codeine (Vomiting)      LABS:                        10.3   9.26  )-----------( 156      ( 30 Mar 2021 02:51 )             32.0     03-30    139  |  103  |  16  ----------------------------<  177<H>  3.5   |  26  |  0.94    Ca    8.2<L>      30 Mar 2021 02:50  Phos  4.3     03-30  Mg     1.3     03-30    TPro  7.4  /  Alb  4.0  /  TBili  0.8  /  DBili  x   /  AST  32  /  ALT  13  /  AlkPhos  141<H>  03-29    PT/INR - ( 30 Mar 2021 02:50 )   PT: 14.0 sec;   INR: 1.18          PTT - ( 30 Mar 2021 02:50 )  PTT:32.6 sec  Urinalysis Basic - ( 29 Mar 2021 19:09 )    Color: Yellow / Appearance: OTHER / S.015 / pH: x  Gluc: x / Ketone: 15 mg/dL  / Bili: Negative / Urobili: 0.2 E.U./dL   Blood: x / Protein: >=300 mg/dL / Nitrite: POSITIVE   Leuk Esterase: NEGATIVE / RBC: Many /HPF / WBC < 5 /HPF   Sq Epi: x / Non Sq Epi: 0-5 /HPF / Bacteria: Present /HPF        RADIOLOGY & ADDITIONAL TESTS: Reviewed.    ASSESSMENT:    PLAN:    PULMONARY    NEURO    CARDIOVASCULAR    RENAL    ID    GI/FEN    DVT PPx -     LINES -     CODE -     DISPO - continue intensive level of care in CCM/MICU service INTERVAL HPI/OVERNIGHT EVENTS: went down from head CT and abd/pel non con-prelim read w/o bleed (gave 1mg versed). 11pm BS 416cc--> straight cath. . ordered 250cc NS plus additional 500 and increased maintence from 75-150cc/hr. lactate cleared 1.1. Started jose francisco for BP support. gave 40meq K powder, 2g Mag    SUBJECTIVE: Patient seen and examined at bedside. Patient denies abdominal pain at this time. Denies N/V/D.     OBJECTIVE:    VITAL SIGNS:  ICU Vital Signs Last 24 Hrs  T(C): 37 (30 Mar 2021 10:06), Max: 38.4 (29 Mar 2021 20:00)  T(F): 98.6 (30 Mar 2021 10:06), Max: 101.1 (29 Mar 2021 20:00)  HR: 72 (30 Mar 2021 10:00) (70 - 112)  BP: 166/75 (30 Mar 2021 10:00) (103/54 - 247/134)  BP(mean): 108 (30 Mar 2021 10:00) (75 - 179)  ABP: --  ABP(mean): --  RR: 13 (30 Mar 2021 10:00) (13 - 29)  SpO2: 100% (30 Mar 2021 10:00) (98% - 100%)         @ 07: @ 07:00  --------------------------------------------------------  IN: 1999.8 mL / OUT: 1050 mL / NET: 949.8 mL     @ 07: @ 12:22  --------------------------------------------------------  IN: 130 mL / OUT: 0 mL / NET: 130 mL      CAPILLARY BLOOD GLUCOSE  160 (29 Mar 2021 13:49)      POCT Blood Glucose.: 197 mg/dL (30 Mar 2021 11:43)      PHYSICAL EXAM:    Physical exam:  General: lethargic, opens eyes to voice  HEENT: NC/AT; PERRL, anicteric sclera; MMM  Neck: supple  Cardiovascular: +S1/S2; RRR  Respiratory: CTA B/L; no W/R/R  Gastrointestinal: soft, NT/ND; +BSx4  Extremities: WWP; no edema, clubbing or cyanosis  Vascular: 2+ radial, DP/PT pulses B/L    Neurologic:  Mental status: awake, alert, oriented to person, month, year, patricia hill. Able to name pen, ring, watch, key.  Speech is fluent. Follows commands. Attention/concentration intact. No dysarthria, no aphasia.  Cranial nerves:   II: Unable to count fingers in the left upper temporal quadrant, otherwise able to count fingers in all other quadrants. pupils equally round and reactive to light,   III, IV, VI: EOMI without nystagmus  V:  V1-V3 sensation intact,   VII: no facial droop, facie is symmetric with normal eye closure and smile  VIII: hearing is intact to finger rub  IX, X: Uvula is midline and soft palate rises symmetrically  XI: head turning and shoulder shrug are intact.  XII: tongue midline  Motor: Normal bulk and tone, strength 5/5 in b/l UE and LE,  strength 5/5.  Sensation: intact to light touch. No neglect. Now attending to left side.  Coordination: No dysmetria on finger-to-nose bilaterally. Slight tremor in the left arm which she states that she has when she is cold  Reflexes: downgoing toes bilaterally        MEDICATIONS:  MEDICATIONS  (STANDING):  anastrozole 1 milliGRAM(s) Oral daily  aspirin enteric coated 81 milliGRAM(s) Oral daily  atorvastatin 80 milliGRAM(s) Oral at bedtime  cefTRIAXone   IVPB 1000 milliGRAM(s) IV Intermittent every 24 hours  chlorhexidine 2% Cloths 1 Application(s) Topical <User Schedule>  clopidogrel Tablet 75 milliGRAM(s) Oral daily  dextrose 40% Gel 15 Gram(s) Oral Once  dextrose 5%. 1000 milliLiter(s) (50 mL/Hr) IV Continuous <Continuous>  dextrose 5%. 1000 milliLiter(s) (100 mL/Hr) IV Continuous <Continuous>  dextrose 50% Injectable 25 Gram(s) IV Push Once  dextrose 50% Injectable 12.5 Gram(s) IV Push Once  dextrose 50% Injectable 25 Gram(s) IV Push Once  glucagon  Injectable 1 milliGRAM(s) IntraMuscular Once  heparin   Injectable 5000 Unit(s) SubCutaneous every 8 hours  insulin glargine Injectable (LANTUS) 9 Unit(s) SubCutaneous every morning  insulin lispro (ADMELOG) corrective regimen sliding scale   SubCutaneous Before meals and at bedtime  insulin lispro Injectable (ADMELOG) 3 Unit(s) SubCutaneous three times a day with meals  phenylephrine    Infusion 0.3 MICROgram(s)/kG/Min (7.4 mL/Hr) IV Continuous <Continuous>    MEDICATIONS  (PRN):  acetaminophen   Tablet .. 650 milliGRAM(s) Oral every 6 hours PRN Temp greater or equal to 38C (100.4F)      ALLERGIES:  Allergies    NSAIDs (Swelling)    Intolerances    codeine (Vomiting)      LABS:                        10.3   9.26  )-----------( 156      ( 30 Mar 2021 02:51 )             32.0     03-30    139  |  103  |  16  ----------------------------<  177<H>  3.5   |  26  |  0.94    Ca    8.2<L>      30 Mar 2021 02:50  Phos  4.3     03-30  Mg     1.3     -30    TPro  7.4  /  Alb  4.0  /  TBili  0.8  /  DBili  x   /  AST  32  /  ALT  13  /  AlkPhos  141<H>  03-29    PT/INR - ( 30 Mar 2021 02:50 )   PT: 14.0 sec;   INR: 1.18          PTT - ( 30 Mar 2021 02:50 )  PTT:32.6 sec  Urinalysis Basic - ( 29 Mar 2021 19:09 )    Color: Yellow / Appearance: OTHER / S.015 / pH: x  Gluc: x / Ketone: 15 mg/dL  / Bili: Negative / Urobili: 0.2 E.U./dL   Blood: x / Protein: >=300 mg/dL / Nitrite: POSITIVE   Leuk Esterase: NEGATIVE / RBC: Many /HPF / WBC < 5 /HPF   Sq Epi: x / Non Sq Epi: 0-5 /HPF / Bacteria: Present /HPF        RADIOLOGY & ADDITIONAL TESTS: Reviewed.

## 2021-03-30 NOTE — CHART NOTE - NSCHARTNOTEFT_GEN_A_CORE
Dr. Lopez and stroke ACP spoke to patient's daughter at length regarding hospital course. All questions and concerns were answered and addressed. Explained that she has a small right sided stroke and her blood pressure is being managed in the ICU to prevent from dropping too low. Pending MRI. Patient and family from [facility], therefore, asking about discharge, however, explained that PT and OT will assess to determine if rehab is recommended.
Goals reviewed at interdisciplinary rounds with case management, social work, physical therapy, occupational therapy, and speech language pathology.    Please see specific therapy  notes for in depth goals.    Highlights of goals are:    Patient will:   Physical therapy  [] remain on bedrest  [] get out of bed to chair [x] ambulate with assistance [] ambulate without assistance  [x]today       [] by discharge    Occupational therapy  [] N/a, independent [x] balance training  []go from supine to seated independently [] balance sitting at the edge of the bed to do grooming task []stand at sink to perform grooming task  [] dress self   [] gain strength to feed self  [] other:  [x]today         []by discharge    Speech therapy  [] N/a, no speech deficit [] vocalize [x] respond to yes/no questions given cues with 80% accuracy [x] name common objects [] express basic needs/wants  []other:  [x]today        [] by discharge      Swallow therapy  [x] tolerate regular diet [] remain NPO, receive oral care to minimize aspirating bacteria mouth bacteria [] tolerate pureed diet  [] tolerate mechanical soft diet  [] tolerate tube feeds [] tolerate thin liquids [] tolerate nectar thick liquids [] other:  [] with assistance  [x] today       [] by discharge    Patient discussed with occupational therapy today who saw the patient, likely recommendation for home.
Pt seen and examined with MICU resident at 8:30 PM on 3/29/21 for worsening mentation. On exam, patient stupurous, not following commands, but spontaneously moving all four extremities with at least 4/5 strength. Pt also complaining of abdominal pain, localized to RLQ on physical examination. Change in exam discussed with Dr. Lopez. Urgent HCT and CT abdomen and pelvis ordered as per discussion with attending. Pending official read of both scans.

## 2021-03-30 NOTE — PROCEDURE NOTE - NSICDXPROCEDURE_GEN_ALL_CORE_FT
PROCEDURES:  Insertion, catheter, intravenous 30-Mar-2021 03:53:18  Dagoberto Troy  Ultrasound guided venous access 30-Mar-2021 03:53:08  Dagoberto Troy

## 2021-03-30 NOTE — PROGRESS NOTE ADULT - ASSESSMENT
76 F w/ hx of CAD, HTN, DM, HLD, TIA (2011), PVD, Breat CA (s/p chemo/radiation) admitted to stroke ICU for acute CVA post cardiac catheterization. Cardiology consulted for continued management of coronary artery disease.     #Coronary Artery Disease: [facility] 03/29. s/p ABDOULAYE mLAD, s/p ABDOULAYE OM2, s/p shockwave/ABDOULAYE pRCA.   - c/w ASA 81mg PO daily and Plavix 75mg PO daily   - c/w Lipitor 80mg qhs   - Would start Lopressor 25mg q12h  - LV systolic function normal     #HTN: Per stroke team, will allow permissive HTN w/ goal 160-180.  - she is currently on Phenylephrine for permissive BP  - Will consider starting PO agents once stroke team allows for better BP control     #CVA:   - managed by stroke team  - No inter atrial shunt noted on TTE    Discussed w/ primary team   Cardiology will continue to follow along  Please call us directly for any questions or concerns

## 2021-03-30 NOTE — PROGRESS NOTE ADULT - ATTENDING COMMENTS
Initial attending contact date  3/29/21    . See fellow note written above for details. I reviewed the fellow documentation. I have personally seen and examined this patient. I reviewed vitals, labs, medications, cardiac studies, and additional imaging. I agree with the above fellow's findings and plans as written above with the following additions/statements.      76 F w/ hx of CAD, HTN, DM, HLD, TIA (2011), PVD, Breat CA (s/p chemo/radiation) admitted to stroke ICU for acute CVA post cardiac catheterization s/p ABDOULAYE mLAD, OM2, pRCA. Cardiology consulted for continued management of coronary artery disease.     -CAD status stable: cont ASA/plavix/statin. Restart metoprolol tartrate 25 mg po bid  -CVA in distribution of R MCA: today with sig clinical improvement, AAO x 3, alert and talkative with 5/5 strength all 4 extremities. Would give hydralazine 10 mg IV prn SBP >180. Goal -180  -Neuro following  -ECHO: with normal LVEF, without interatrial shunt  -Will cont to follow

## 2021-03-30 NOTE — PHYSICAL THERAPY INITIAL EVALUATION ADULT - ADDITIONAL COMMENTS
Pt lives in [facility] with  on second story (~15 steps) of three family home; children live in other two units. Daughter reports pt was ind w/ all ambulation w/out use of AD PTA. Daughter lives on first floor w/ 3 PAGE.

## 2021-03-30 NOTE — PROGRESS NOTE ADULT - ASSESSMENT
75 yo F w/ a PMH of HTN, HLD, DM, TIA in 2011 (no residual deficits), PVD, Breast CA s/p chemo/radiation (on Anastrazole) s/p cardiac cath and had 3 stents placed on 3/29.  Stroke code was called about 15 minutes post cardiac cath due to confusion, difficulty following commands and getting words out. HCT negative. CTA showed no large vessel occlusion. Given that patient received heparin bolus and PTT >200, tPA was not administered. Initial NIHSS 3, however, when BP was controlled to 150/80, patient was found to have some slight left sided weakness and left sided neglect which matches decreased perfusion area along the right MCA territory. Therefore, it is possible that patient has right MCA stroke.     NEURO  #R/o Right MCA Stroke  Stroke code was called about 15 minutes post cardiac cath due to confusion, difficulty following commands and getting words out. HCT negative. CTA showed no large vessel occlusion. Given that patient received heparin bolus and PTT >200, tPA was not administered. Initial NIHSS 3, however, when BP was controlled to 150/80, patient was found to have some slight left sided weakness and left sided neglect which matches decreased perfusion area along the right MCA territory.  - Closely follow neuro checks every 1 hour  - Repeat HCT for acute changes in neuro exam  - Obtain MRI Brain without contrast  - Obtain TTE with bubble  - Goal -180  - Bedside Dysphagia Screen  - PT/OT/SLP   - Obtain Hemoglobin A1C, Lipid Profile Panel, and TSH  - continue ASA 81mg & Plavix 75mg - was loaded with ASA 325mg and Plavix 600mg this morning  - Increase atorvastatin to 80mg daily- cholesterol and stroke prevention   - Start heparin SQ and SCDs for DVT prophylaxis     RESPIRATORY  No active issues    CARDIOVASCULAR  #Active CAD  - s/p cardiac cath and had 3 stents placed  - On ASA 81 and plavix 75    #Hypertensive emergency  - bp in 240s/120s with wretching and vomiting shortly after arrival to MICU  - given labetalol with good response  - goal sbp 160-200     #HTN  - on home lisinopril 10 and toprol 100  - dc antihypertensives to maintain BP     #HLD  - on home rosuvastatin 10  - c/w atorvastatin 80 inpatient    GI  No active issues    RENAL  No active issues    ENDO  #DM2  - on home metformin 500 BID, januvia 100 qd  - on Novolog 4 at lunch, tresiba 14 daily    ID  No active issues    HEME-ONC  #h/o breast cancer  - on home anastrazole 1 qd    F: None   E: Replete for K<4 Mag<2  N: NPO  A: HSQ  Code status: full  Dispo: [id number]

## 2021-03-30 NOTE — PHYSICAL THERAPY INITIAL EVALUATION ADULT - PERTINENT HX OF CURRENT PROBLEM, REHAB EVAL
76Fs/p cardiac cath & had 3 stents placed on 3/29. Stroke code was called about 15 minutes post cardiac cath due to confusion, difficulty following commands & getting words out. HCT negative. CTA showed no large vessel occlusion. tPA was not administered. Initial NIHSS 3. Pt was found to have some slight L sided weakness & L sided neglect which matches dec perfusion area along R MCA territory.

## 2021-03-30 NOTE — PROGRESS NOTE ADULT - SUBJECTIVE AND OBJECTIVE BOX
Neurology Stroke Progress Note    INTERVAL HPI/OVERNIGHT EVENTS:  Patient seen and examined this morning. Overnight patient complaining of abdominal pain, CTA of abdomen pelvis with no bleed. In addition, also had repeat CTH which was stable.   This morning patient states that she is feeling better. Denies abdominal pain, denies chest pain, headache, new weakness or numbness.    MEDICATIONS  (STANDING):  anastrozole 1 milliGRAM(s) Oral daily  aspirin enteric coated 81 milliGRAM(s) Oral daily  atorvastatin 80 milliGRAM(s) Oral at bedtime  cefTRIAXone   IVPB 1000 milliGRAM(s) IV Intermittent every 24 hours  chlorhexidine 2% Cloths 1 Application(s) Topical <User Schedule>  clopidogrel Tablet 75 milliGRAM(s) Oral daily  dextrose 40% Gel 15 Gram(s) Oral Once  dextrose 5%. 1000 milliLiter(s) (50 mL/Hr) IV Continuous <Continuous>  dextrose 5%. 1000 milliLiter(s) (100 mL/Hr) IV Continuous <Continuous>  dextrose 50% Injectable 25 Gram(s) IV Push Once  dextrose 50% Injectable 12.5 Gram(s) IV Push Once  dextrose 50% Injectable 25 Gram(s) IV Push Once  glucagon  Injectable 1 milliGRAM(s) IntraMuscular Once  heparin   Injectable 5000 Unit(s) SubCutaneous every 8 hours  insulin glargine Injectable (LANTUS) 9 Unit(s) SubCutaneous every morning  insulin lispro (ADMELOG) corrective regimen sliding scale   SubCutaneous Before meals and at bedtime  insulin lispro Injectable (ADMELOG) 3 Unit(s) SubCutaneous three times a day with meals  metoprolol succinate  milliGRAM(s) Oral daily  naloxone Injectable 0.4 milliGRAM(s) IV Push once  phenylephrine    Infusion 0.3 MICROgram(s)/kG/Min (7.4 mL/Hr) IV Continuous <Continuous>    MEDICATIONS  (PRN):  acetaminophen   Tablet .. 650 milliGRAM(s) Oral every 6 hours PRN Temp greater or equal to 38C (100.4F)      Allergies    NSAIDs (Swelling)    Intolerances    codeine (Vomiting)      ROS: As per HPI, otherwise negative    Vital Signs Last 24 Hrs  T(C): 37.1 (30 Mar 2021 05:30), Max: 38.4 (29 Mar 2021 20:00)  T(F): 98.8 (30 Mar 2021 05:30), Max: 101.1 (29 Mar 2021 20:00)  HR: 82 (30 Mar 2021 08:00) (72 - 112)  BP: 141/67 (30 Mar 2021 08:00) (103/54 - 247/134)  BP(mean): 96 (30 Mar 2021 08:00) (75 - 179)  RR: 13 (30 Mar 2021 08:00) (13 - 29)  SpO2: 100% (30 Mar 2021 08:00) (98% - 100%)    Physical exam:  General: awake and alert, sitting comfortably, no acute distress    Neurologic:  Mental status: awake, alert, oriented to person, month, year, patricia hill. Able to name pen, ring, watch, key.  Speech is fluent. Follows commands. Attention/concentration intact. No dysarthria, no aphasia.  Cranial nerves:   II: Unable to count fingers in the left upper temporal quadrant, otherwise able to count fingers in all other quadrants. pupils equally round and reactive to light,   III, IV, VI: EOMI without nystagmus  V:  V1-V3 sensation intact,   VII: no facial droop, facie is symmetric with normal eye closure and smile  VIII: hearing is intact to finger rub  IX, X: Uvula is midline and soft palate rises symmetrically  XI: head turning and shoulder shrug are intact.  XII: tongue midline  Motor: Normal bulk and tone, strength 5/5 in b/l UE and LE,  strength 5/5.  Sensation: intact to light touch. No neglect. Now attending to left side.  Coordination: No dysmetria on finger-to-nose bilaterally. Slight tremor in the left arm which she states that she has when she is cold  Reflexes: downgoing toes bilaterally    LABS:                        10.3   9.26  )-----------( 156      ( 30 Mar 2021 02:51 )             32.0     03-30    139  |  103  |  16  ----------------------------<  177<H>  3.5   |  26  |  0.94    Ca    8.2<L>      30 Mar 2021 02:50  Phos  4.3     03-30  Mg     1.3     03-30    TPro  7.4  /  Alb  4.0  /  TBili  0.8  /  DBili  x   /  AST  32  /  ALT  13  /  AlkPhos  141<H>      PT/INR - ( 30 Mar 2021 02:50 )   PT: 14.0 sec;   INR: 1.18          PTT - ( 30 Mar 2021 02:50 )  PTT:32.6 sec  Urinalysis Basic - ( 29 Mar 2021 19:09 )    Color: Yellow / Appearance: OTHER / S.015 / pH: x  Gluc: x / Ketone: 15 mg/dL  / Bili: Negative / Urobili: 0.2 E.U./dL   Blood: x / Protein: >=300 mg/dL / Nitrite: POSITIVE   Leuk Esterase: NEGATIVE / RBC: Many /HPF / WBC < 5 /HPF   Sq Epi: x / Non Sq Epi: 0-5 /HPF / Bacteria: Present /HPF      RADIOLOGY & ADDITIONAL TESTS:  CT Head No Cont (21 @ 22:28) >  IMPRESSION:    No acute intracranial hemorrhage, mass effect, or territorial infarction.    Age-appropriate volume loss with chronic changes, as above.    CT Abdomen and Pelvis No Cont (21 @ 22:28) >  IMPRESSION:  No retroperitoneal hematoma. No abdominopelvic fluid collection. Small dense pericardial effusion.    Assessment and Plan  74 yo F w/ a PMH of HTN, HLD, DM, TIA in  (no residual deficits), PVD, Breast CA s/p chemo/radiation (on Anastrazole) s/p cardiac cath and had 3 stents placed on 3/29.  Stroke code on 3/29 was called about 15 minutes post cardiac cath due to confusion, difficulty following commands and getting words out. HCT negative. CTA showed no large vessel occlusion. Given that patient received heparin bolus and PTT >200, tPA was not administered. Initial NIHSS 3, however, when BP was controlled to 150/80, patient was found to have some slight left sided weakness and left sided neglect which matches decreased perfusion area along the right MCA territory. Course complicated by abdominal pain, CT Abdomen pelvis ruled out abdominal bleed. Repeat CTH stable. Possible hypodensity in the cortical region of right MCA, there, it is possible that patient had a small right MCA stroke.     - Closely follow neuro checks every 1 hour  - Repeat HCT for acute changes in neuro exam  - Obtain MRI Brain without contrast  - Obtain TTE with bubble  - Goal -180  - Hold all blood pressure medications, continue phenylephrine for now to keep blood pressures up  - Bedside Dysphagia Screen  - PT/OT/SLP   - Obtain Hemoglobin A1C, Lipid Profile Panel, and TSH    Secondary Stroke Prevention Medication  - continue ASA 81mg & Plavix 75mg -   - continue atorvastatin to 80mg daily- cholesterol and stroke prevention   - Start heparin SQ and SCDs for DVT prophylaxis

## 2021-03-30 NOTE — DIETITIAN INITIAL EVALUATION ADULT. - OTHER CALCULATIONS
ActualBW used for calculations as pt between % of IBW (112% IBW). Needs estimated for age and adjusted for CVA

## 2021-03-30 NOTE — PROGRESS NOTE ADULT - SUBJECTIVE AND OBJECTIVE BOX
Subjective:  Patient seen and examined. She is sitting up in her chair, eating breakfast. She is symptomatically improved. She denies chest pain or SOB. Overall, she is feeling better.     PAST MEDICAL & SURGICAL HISTORY:  HTN (hypertension)    HLD (hyperlipidemia)    DM (diabetes mellitus)    Breast cancer    H/O:     S/P appendectomy    S/P dilation and curettage    H/O lumpectomy        MEDICATIONS  (STANDING):  anastrozole 1 milliGRAM(s) Oral daily  aspirin enteric coated 81 milliGRAM(s) Oral daily  atorvastatin 80 milliGRAM(s) Oral at bedtime  cefTRIAXone   IVPB 1000 milliGRAM(s) IV Intermittent every 24 hours  chlorhexidine 2% Cloths 1 Application(s) Topical <User Schedule>  clopidogrel Tablet 75 milliGRAM(s) Oral daily  dextrose 40% Gel 15 Gram(s) Oral Once  dextrose 5%. 1000 milliLiter(s) (50 mL/Hr) IV Continuous <Continuous>  dextrose 5%. 1000 milliLiter(s) (100 mL/Hr) IV Continuous <Continuous>  dextrose 50% Injectable 25 Gram(s) IV Push Once  dextrose 50% Injectable 12.5 Gram(s) IV Push Once  dextrose 50% Injectable 25 Gram(s) IV Push Once  glucagon  Injectable 1 milliGRAM(s) IntraMuscular Once  heparin   Injectable 5000 Unit(s) SubCutaneous every 8 hours  insulin glargine Injectable (LANTUS) 9 Unit(s) SubCutaneous every morning  insulin lispro (ADMELOG) corrective regimen sliding scale   SubCutaneous Before meals and at bedtime  insulin lispro Injectable (ADMELOG) 3 Unit(s) SubCutaneous three times a day with meals  phenylephrine    Infusion 0.3 MICROgram(s)/kG/Min (7.4 mL/Hr) IV Continuous <Continuous>      Vital Signs Last 24 Hrs  T(C): 38.2 (30 Mar 2021 12:00), Max: 38.4 (29 Mar 2021 20:00)  T(F): 100.8 (30 Mar 2021 12:00), Max: 101.1 (29 Mar 2021 20:00)  HR: 99 (30 Mar 2021 14:00) (70 - 123)  BP: 178/83 (30 Mar 2021 14:00) (103/54 - 247/134)  BP(mean): 119 (30 Mar 2021 14:00) (75 - 179)  RR: 20 (30 Mar 2021 14:00) (12 - 30)  SpO2: 99% (30 Mar 2021 14:00) (97% - 100%)    Daily     Daily Weight in k.3 (29 Mar 2021 20:00)    Physical Exam:   GEN: NAD, AAOx3  HEENT: MMM, no icterus  CV: S1 S2 RRR, no MRG  Lung: CTAB  Abd: soft NT ND +BS  Ext: no c/c/e  Neuro: no focal neuro deficit      LABS:                        10.3   9.26  )-----------( 156      ( 30 Mar 2021 02:51 )             32.0     03-30    139  |  103  |  16  ----------------------------<  177<H>  3.5   |  26  |  0.94    Ca    8.2<L>      30 Mar 2021 02:50  Phos  4.3     03-30  Mg     1.3     30    TPro  7.4  /  Alb  4.0  /  TBili  0.8  /  DBili  x   /  AST  32  /  ALT  13  /  AlkPhos  141<H>  03-29    CARDIAC MARKERS ( 29 Mar 2021 07:48 )  x     / x     / 50 U/L / x     / <1.0 ng/mL      PT/INR - ( 30 Mar 2021 02:50 )   PT: 14.0 sec;   INR: 1.18          PTT - ( 30 Mar 2021 02:50 )  PTT:32.6 sec    I&O's Detail    29 Mar 2021 07:01  -  30 Mar 2021 07:00  --------------------------------------------------------  IN:    IV PiggyBack: 950 mL    Phenylephrine: 74.8 mL    sodium chloride 0.9%: 975 mL  Total IN: 1999.8 mL    OUT:    Intermittent Catheterization - Urethral (mL): 600 mL    Voided (mL): 450 mL  Total OUT: 1050 mL    Total NET: 949.8 mL      30 Mar 2021 07:01  -  30 Mar 2021 14:23  --------------------------------------------------------  IN:    IV PiggyBack: 165 mL    Phenylephrine: 40 mL    Phenylephrine: 55 mL  Total IN: 260 mL    OUT:    Phenylephrine: 0 mL  Total OUT: 0 mL    Total NET: 260 mL              RADIOLOGY & ADDITIONAL STUDIES:

## 2021-03-30 NOTE — PHYSICAL THERAPY INITIAL EVALUATION ADULT - MODALITIES TREATMENT COMMENTS
Pt able to visually track symmetrically to R and L visual fields. WILL further visual or CN testing 2/2 pt lethargy and difficulty following commands this date.

## 2021-03-30 NOTE — DIETITIAN INITIAL EVALUATION ADULT. - ADD RECOMMEND
1. Encourage PO intake. Maintain aspiration precautions at all times 2. Monitor lytes and replete prn. POC BG q6hrs 3. Pain and bowel regimens per team 4. Reinforce diet ed

## 2021-03-30 NOTE — DIETITIAN INITIAL EVALUATION ADULT. - OTHER INFO
74 yo F w/ a PMH of HTN, HLD, DM, TIA in 2011 (no residual deficits), PVD, Breast CA s/p chemo/radiation (on Anastrazole) s/p cardiac cath and had 3 stents placed on 3/29.  Stroke code on 3/29 was called about 15 minutes post cardiac cath due to confusion, difficulty following commands and getting words out. HCT negative. CTA showed no large vessel occlusion. Given that patient received heparin bolus and PTT >200, tPA was not administered. Initial NIHSS 3, however, when BP was controlled to 150/80, patient was found to have some slight left sided weakness and left sided neglect which matches decreased perfusion area along the right MCA territory. Course complicated by abdominal pain, CT Abdomen pelvis ruled out abdominal bleed. Repeat CTH stable. Possible hypodensity in the cortical region of right MCA, there, it is possible that patient had a small right MCA stroke. Pt seen in room, awake, alert, pleasant, but appearing distressed. Was complaining of bad headache (had just received tylenol). Remains on phenylephrine-  at time of visit. She endorsed that she generally eats well at home and likes oatmeal, eggs, cheese sandwiches, egg salad, roast chicken, and salad. Confirmed NKFA. Observed ~30% intake of breakfast including some scrambled eggs and 1/2 banana. She denied complaints of N/V/C/D. She denied difficulty chewing or swallowing. Skin noted w/surgical wound from cardiac cath. No edema. Afebrile. She endorsed that she follows with a dietitian at outpatient DM clinic and is aware of dietary modifications. DM/DASH diet ed reviewed and handouts provided. Will continue to follow per RD protocol.

## 2021-03-31 LAB
ALBUMIN SERPL ELPH-MCNC: 3.3 G/DL — SIGNIFICANT CHANGE UP (ref 3.3–5)
ALBUMIN SERPL ELPH-MCNC: 3.4 G/DL — SIGNIFICANT CHANGE UP (ref 3.3–5)
ALP SERPL-CCNC: 104 U/L — SIGNIFICANT CHANGE UP (ref 40–120)
ALP SERPL-CCNC: 99 U/L — SIGNIFICANT CHANGE UP (ref 40–120)
ALT FLD-CCNC: 11 U/L — SIGNIFICANT CHANGE UP (ref 10–45)
ALT FLD-CCNC: 14 U/L — SIGNIFICANT CHANGE UP (ref 10–45)
ANION GAP SERPL CALC-SCNC: 13 MMOL/L — SIGNIFICANT CHANGE UP (ref 5–17)
ANION GAP SERPL CALC-SCNC: 14 MMOL/L — SIGNIFICANT CHANGE UP (ref 5–17)
AST SERPL-CCNC: 36 U/L — SIGNIFICANT CHANGE UP (ref 10–40)
AST SERPL-CCNC: 51 U/L — HIGH (ref 10–40)
BASOPHILS # BLD AUTO: 0.03 K/UL — SIGNIFICANT CHANGE UP (ref 0–0.2)
BASOPHILS NFR BLD AUTO: 0.4 % — SIGNIFICANT CHANGE UP (ref 0–2)
BILIRUB SERPL-MCNC: 0.7 MG/DL — SIGNIFICANT CHANGE UP (ref 0.2–1.2)
BILIRUB SERPL-MCNC: 0.7 MG/DL — SIGNIFICANT CHANGE UP (ref 0.2–1.2)
BUN SERPL-MCNC: 21 MG/DL — SIGNIFICANT CHANGE UP (ref 7–23)
BUN SERPL-MCNC: 22 MG/DL — SIGNIFICANT CHANGE UP (ref 7–23)
CALCIUM SERPL-MCNC: 8.1 MG/DL — LOW (ref 8.4–10.5)
CALCIUM SERPL-MCNC: 8.2 MG/DL — LOW (ref 8.4–10.5)
CHLORIDE SERPL-SCNC: 103 MMOL/L — SIGNIFICANT CHANGE UP (ref 96–108)
CHLORIDE SERPL-SCNC: 103 MMOL/L — SIGNIFICANT CHANGE UP (ref 96–108)
CO2 SERPL-SCNC: 22 MMOL/L — SIGNIFICANT CHANGE UP (ref 22–31)
CO2 SERPL-SCNC: 23 MMOL/L — SIGNIFICANT CHANGE UP (ref 22–31)
CREAT SERPL-MCNC: 0.94 MG/DL — SIGNIFICANT CHANGE UP (ref 0.5–1.3)
CREAT SERPL-MCNC: 0.94 MG/DL — SIGNIFICANT CHANGE UP (ref 0.5–1.3)
CULTURE RESULTS: SIGNIFICANT CHANGE UP
EOSINOPHIL # BLD AUTO: 0.02 K/UL — SIGNIFICANT CHANGE UP (ref 0–0.5)
EOSINOPHIL NFR BLD AUTO: 0.2 % — SIGNIFICANT CHANGE UP (ref 0–6)
GLUCOSE BLDC GLUCOMTR-MCNC: 137 MG/DL — HIGH (ref 70–99)
GLUCOSE BLDC GLUCOMTR-MCNC: 307 MG/DL — HIGH (ref 70–99)
GLUCOSE BLDC GLUCOMTR-MCNC: 338 MG/DL — HIGH (ref 70–99)
GLUCOSE BLDC GLUCOMTR-MCNC: 392 MG/DL — HIGH (ref 70–99)
GLUCOSE SERPL-MCNC: 152 MG/DL — HIGH (ref 70–99)
GLUCOSE SERPL-MCNC: 152 MG/DL — HIGH (ref 70–99)
HCT VFR BLD CALC: 33.5 % — LOW (ref 34.5–45)
HGB BLD-MCNC: 10.3 G/DL — LOW (ref 11.5–15.5)
IMM GRANULOCYTES NFR BLD AUTO: 0.5 % — SIGNIFICANT CHANGE UP (ref 0–1.5)
LYMPHOCYTES # BLD AUTO: 1.42 K/UL — SIGNIFICANT CHANGE UP (ref 1–3.3)
LYMPHOCYTES # BLD AUTO: 17.4 % — SIGNIFICANT CHANGE UP (ref 13–44)
MAGNESIUM SERPL-MCNC: 2 MG/DL — SIGNIFICANT CHANGE UP (ref 1.6–2.6)
MCHC RBC-ENTMCNC: 30.3 PG — SIGNIFICANT CHANGE UP (ref 27–34)
MCHC RBC-ENTMCNC: 30.7 GM/DL — LOW (ref 32–36)
MCV RBC AUTO: 98.5 FL — SIGNIFICANT CHANGE UP (ref 80–100)
MONOCYTES # BLD AUTO: 0.74 K/UL — SIGNIFICANT CHANGE UP (ref 0–0.9)
MONOCYTES NFR BLD AUTO: 9.1 % — SIGNIFICANT CHANGE UP (ref 2–14)
NEUTROPHILS # BLD AUTO: 5.91 K/UL — SIGNIFICANT CHANGE UP (ref 1.8–7.4)
NEUTROPHILS NFR BLD AUTO: 72.4 % — SIGNIFICANT CHANGE UP (ref 43–77)
NRBC # BLD: 0 /100 WBCS — SIGNIFICANT CHANGE UP (ref 0–0)
PHOSPHATE SERPL-MCNC: 3.1 MG/DL — SIGNIFICANT CHANGE UP (ref 2.5–4.5)
PLATELET # BLD AUTO: 137 K/UL — LOW (ref 150–400)
POTASSIUM SERPL-MCNC: 3.6 MMOL/L — SIGNIFICANT CHANGE UP (ref 3.5–5.3)
POTASSIUM SERPL-MCNC: SIGNIFICANT CHANGE UP (ref 3.5–5.3)
POTASSIUM SERPL-SCNC: 3.6 MMOL/L — SIGNIFICANT CHANGE UP (ref 3.5–5.3)
POTASSIUM SERPL-SCNC: SIGNIFICANT CHANGE UP (ref 3.5–5.3)
PROT SERPL-MCNC: 6.1 G/DL — SIGNIFICANT CHANGE UP (ref 6–8.3)
PROT SERPL-MCNC: 6.3 G/DL — SIGNIFICANT CHANGE UP (ref 6–8.3)
RBC # BLD: 3.4 M/UL — LOW (ref 3.8–5.2)
RBC # FLD: 14.3 % — SIGNIFICANT CHANGE UP (ref 10.3–14.5)
SODIUM SERPL-SCNC: 138 MMOL/L — SIGNIFICANT CHANGE UP (ref 135–145)
SODIUM SERPL-SCNC: 140 MMOL/L — SIGNIFICANT CHANGE UP (ref 135–145)
SPECIMEN SOURCE: SIGNIFICANT CHANGE UP
WBC # BLD: 8.16 K/UL — SIGNIFICANT CHANGE UP (ref 3.8–10.5)
WBC # FLD AUTO: 8.16 K/UL — SIGNIFICANT CHANGE UP (ref 3.8–10.5)

## 2021-03-31 PROCEDURE — 70551 MRI BRAIN STEM W/O DYE: CPT | Mod: 26

## 2021-03-31 PROCEDURE — 99233 SBSQ HOSP IP/OBS HIGH 50: CPT

## 2021-03-31 RX ORDER — POTASSIUM CHLORIDE 20 MEQ
40 PACKET (EA) ORAL ONCE
Refills: 0 | Status: COMPLETED | OUTPATIENT
Start: 2021-03-31 | End: 2021-03-31

## 2021-03-31 RX ORDER — INSULIN LISPRO 100/ML
5 VIAL (ML) SUBCUTANEOUS
Refills: 0 | Status: DISCONTINUED | OUTPATIENT
Start: 2021-03-31 | End: 2021-04-01

## 2021-03-31 RX ADMIN — HEPARIN SODIUM 5000 UNIT(S): 5000 INJECTION INTRAVENOUS; SUBCUTANEOUS at 06:26

## 2021-03-31 RX ADMIN — Medication 4: at 17:10

## 2021-03-31 RX ADMIN — Medication 5: at 21:22

## 2021-03-31 RX ADMIN — ATORVASTATIN CALCIUM 80 MILLIGRAM(S): 80 TABLET, FILM COATED ORAL at 21:21

## 2021-03-31 RX ADMIN — Medication 3 UNIT(S): at 12:02

## 2021-03-31 RX ADMIN — ANASTROZOLE 1 MILLIGRAM(S): 1 TABLET ORAL at 11:24

## 2021-03-31 RX ADMIN — CLOPIDOGREL BISULFATE 75 MILLIGRAM(S): 75 TABLET, FILM COATED ORAL at 11:24

## 2021-03-31 RX ADMIN — Medication 81 MILLIGRAM(S): at 11:24

## 2021-03-31 RX ADMIN — PHENYLEPHRINE HYDROCHLORIDE 7.4 MICROGRAM(S)/KG/MIN: 10 INJECTION INTRAVENOUS at 01:33

## 2021-03-31 RX ADMIN — Medication 3 UNIT(S): at 17:09

## 2021-03-31 RX ADMIN — Medication 40 MILLIEQUIVALENT(S): at 11:24

## 2021-03-31 RX ADMIN — Medication 4: at 11:24

## 2021-03-31 RX ADMIN — HEPARIN SODIUM 5000 UNIT(S): 5000 INJECTION INTRAVENOUS; SUBCUTANEOUS at 14:59

## 2021-03-31 RX ADMIN — INSULIN GLARGINE 9 UNIT(S): 100 INJECTION, SOLUTION SUBCUTANEOUS at 06:26

## 2021-03-31 RX ADMIN — HEPARIN SODIUM 5000 UNIT(S): 5000 INJECTION INTRAVENOUS; SUBCUTANEOUS at 21:22

## 2021-03-31 RX ADMIN — CHLORHEXIDINE GLUCONATE 1 APPLICATION(S): 213 SOLUTION TOPICAL at 06:27

## 2021-03-31 RX ADMIN — Medication 25 MILLIGRAM(S): at 17:09

## 2021-03-31 NOTE — PROGRESS NOTE ADULT - SUBJECTIVE AND OBJECTIVE BOX
**Incomplete Note** TRANSFER STEPDOWN NOTE 7 EA TO [address] [place] [address] COURSE:   77 y/o F w/ a PMHx of HTN, HLD, DM, TIA in  (no residual deficits), PVD, Breast CA s/p chemo/radiation (on Anastrazole) s/p cardiac cath and had 3 stents placed on 3/29.  Stroke code was called about 15 minutes post cardiac cath due to confusion, difficulty following commands and getting words out. HCT negative. CTA showed no large vessel occlusion. Given that patient received heparin bolus and PTT >200, tPA was not administered. Initial NIHSS 3, however, when BP was controlled to 150/80, patient was found to have some slight left sided weakness and left sided neglect which matches decreased perfusion area along the right MCA territory. TTE negative for shunt, normal RV and LV function. Patient briefly on phenylephrine gtt for keep pressures 120-180, however gtt d/ca and now allowing permissive hypertension. Patient has no weakness or neglect on exam currently, medically stable for stepdown to [facility] under neurology.     OVERNIGHT EVENTS: PEYTON.     SUBJECTIVE / INTERVAL HPI: Patient seen and examined at bedside. Patient denies any blurry vision, headache, chest pain, SOB, abdominal pain, dysuria, etc.     VITAL SIGNS:  Vital Signs Last 24 Hrs  T(C): 36.3 (31 Mar 2021 10:05), Max: 37.9 (30 Mar 2021 14:24)  T(F): 97.4 (31 Mar 2021 10:05), Max: 100.2 (30 Mar 2021 14:24)  HR: 87 (31 Mar 2021 12:00) (77 - 111)  BP: 131/58 (31 Mar 2021 12:00) (101/49 - 233/103)  BP(mean): 84 (31 Mar 2021 12:00) (70 - 148)  RR: 19 (31 Mar 2021 12:00) (0 - 33)  SpO2: 100% (31 Mar 2021 12:00) (97% - 100%)    21 @ 07:01  -  21 @ 07:00  --------------------------------------------------------  IN: 620.3 mL / OUT: 200 mL / NET: 420.3 mL    21 @ 07:01  -  21 @ 12:38  --------------------------------------------------------  IN: 0 mL / OUT: 0 mL / NET: 0 mL    PHYSICAL EXAM:    General: WDWN AA female resting in bed in NAD  HEENT: PERRLA, EOMI bilaterally, MMM  Neck: supple, non-tender, no JVD  Cardiovascular: +S1/S2; RRR, no murmurs, rubs, gallops   Respiratory: CTA B/L; no W/R/R  Gastrointestinal: soft, NT/ND; +BSx4  Extremities: WWP; no edema, clubbing or cyanosis  Vascular: 2+ radial, DP/PT pulses B/L  Neurological: AAOx3; no focal deficits. No pronator drift, neglect, weakness or decreased sensation bilaterally.     MEDICATIONS:  MEDICATIONS  (STANDING):  aspirin enteric coated 81 milliGRAM(s) Oral daily  atorvastatin 80 milliGRAM(s) Oral at bedtime  chlorhexidine 2% Cloths 1 Application(s) Topical <User Schedule>  clopidogrel Tablet 75 milliGRAM(s) Oral daily  dextrose 40% Gel 15 Gram(s) Oral Once  dextrose 5%. 1000 milliLiter(s) (50 mL/Hr) IV Continuous <Continuous>  dextrose 5%. 1000 milliLiter(s) (100 mL/Hr) IV Continuous <Continuous>  dextrose 50% Injectable 25 Gram(s) IV Push Once  dextrose 50% Injectable 12.5 Gram(s) IV Push Once  dextrose 50% Injectable 25 Gram(s) IV Push Once  glucagon  Injectable 1 milliGRAM(s) IntraMuscular Once  heparin   Injectable 5000 Unit(s) SubCutaneous every 8 hours  insulin glargine Injectable (LANTUS) 9 Unit(s) SubCutaneous every morning  insulin lispro (ADMELOG) corrective regimen sliding scale   SubCutaneous Before meals and at bedtime  insulin lispro Injectable (ADMELOG) 3 Unit(s) SubCutaneous three times a day with meals  metoprolol tartrate 25 milliGRAM(s) Oral every 12 hours  phenylephrine    Infusion 0.3 MICROgram(s)/kG/Min (7.4 mL/Hr) IV Continuous <Continuous>    MEDICATIONS  (PRN):  acetaminophen   Tablet .. 650 milliGRAM(s) Oral every 6 hours PRN Temp greater or equal to 38C (100.4F)  ondansetron Injectable 4 milliGRAM(s) IV Push every 6 hours PRN Nausea and/or Vomiting      ALLERGIES:  Allergies    NSAIDs (Swelling)    Intolerances    codeine (Vomiting)      LABS:                        10.3   8.16  )-----------( 137      ( 31 Mar 2021 05:37 )             33.5     03-31    140  |  103  |  22  ----------------------------<  152<H>  3.6   |  23  |  0.94    Ca    8.1<L>      31 Mar 2021 08:23  Phos  3.1     03-31  Mg     2.0         TPro  6.1  /  Alb  3.4  /  TBili  0.7  /  DBili  x   /  AST  36  /  ALT  11  /  AlkPhos  104  03-31    PT/INR - ( 30 Mar 2021 02:50 )   PT: 14.0 sec;   INR: 1.18          PTT - ( 30 Mar 2021 02:50 )  PTT:32.6 sec  Urinalysis Basic - ( 29 Mar 2021 19:09 )    Color: Yellow / Appearance: OTHER / S.015 / pH: x  Gluc: x / Ketone: 15 mg/dL  / Bili: Negative / Urobili: 0.2 E.U./dL   Blood: x / Protein: >=300 mg/dL / Nitrite: POSITIVE   Leuk Esterase: NEGATIVE / RBC: Many /HPF / WBC < 5 /HPF   Sq Epi: x / Non Sq Epi: 0-5 /HPF / Bacteria: Present /HPF      CAPILLARY BLOOD GLUCOSE  160 (29 Mar 2021 13:49)      POCT Blood Glucose.: 307 mg/dL (31 Mar 2021 11:15)        RADIOLOGY & ADDITIONAL TESTS: Reviewed. TRANSFER STEPDOWN NOTE 7 EA TO [address] [place] [address] COURSE:   77 y/o F w/ a PMHx of HTN, HLD, DM, TIA in  (no residual deficits), PVD, Breast CA s/p chemo/radiation (on Anastrazole), CAD s/p cardiac cath and had 3 stents placed on 3/29.  Stroke code was called about 15 minutes post cardiac cath due to confusion, difficulty following commands and getting words out. HCT negative. CTA showed no large vessel occlusion. Given that patient received heparin bolus and PTT >200, tPA was not administered. Initial NIHSS 3, however, when BP was controlled to 150/80, patient was found to have some slight left sided weakness and left sided neglect which matches decreased perfusion area along the right MCA territory. TTE negative for shunt, normal RV and LV function. Patient briefly on phenylephrine gtt for keep pressures 120-180, however gtt d/ca and now allowing permissive hypertension. Patient has no weakness or neglect on exam currently, medically stable for stepdown to [facility] under neurology.     OVERNIGHT EVENTS: PEYTON.     SUBJECTIVE / INTERVAL HPI: Patient seen and examined at bedside. Patient denies any blurry vision, headache, chest pain, SOB, abdominal pain, dysuria, etc.     VITAL SIGNS:  Vital Signs Last 24 Hrs  T(C): 36.3 (31 Mar 2021 10:05), Max: 37.9 (30 Mar 2021 14:24)  T(F): 97.4 (31 Mar 2021 10:05), Max: 100.2 (30 Mar 2021 14:24)  HR: 87 (31 Mar 2021 12:00) (77 - 111)  BP: 131/58 (31 Mar 2021 12:00) (101/49 - 233/103)  BP(mean): 84 (31 Mar 2021 12:00) (70 - 148)  RR: 19 (31 Mar 2021 12:00) (0 - 33)  SpO2: 100% (31 Mar 2021 12:00) (97% - 100%)    21 @ 07:01  -  21 @ 07:00  --------------------------------------------------------  IN: 620.3 mL / OUT: 200 mL / NET: 420.3 mL    21 @ 07:01  -  21 @ 12:38  --------------------------------------------------------  IN: 0 mL / OUT: 0 mL / NET: 0 mL    PHYSICAL EXAM:    General: WDWN AA female resting in bed in NAD  HEENT: PERRLA, EOMI bilaterally, MMM  Neck: supple, non-tender, no JVD  Cardiovascular: +S1/S2; RRR, no murmurs, rubs, gallops   Respiratory: CTA B/L; no W/R/R  Gastrointestinal: soft, NT/ND; +BSx4  Extremities: WWP; no edema, clubbing or cyanosis  Vascular: 2+ radial, DP/PT pulses B/L  Neurological: AAOx3; no focal deficits. No pronator drift, neglect, weakness or decreased sensation bilaterally.     MEDICATIONS:  MEDICATIONS  (STANDING):  aspirin enteric coated 81 milliGRAM(s) Oral daily  atorvastatin 80 milliGRAM(s) Oral at bedtime  chlorhexidine 2% Cloths 1 Application(s) Topical <User Schedule>  clopidogrel Tablet 75 milliGRAM(s) Oral daily  dextrose 40% Gel 15 Gram(s) Oral Once  dextrose 5%. 1000 milliLiter(s) (50 mL/Hr) IV Continuous <Continuous>  dextrose 5%. 1000 milliLiter(s) (100 mL/Hr) IV Continuous <Continuous>  dextrose 50% Injectable 25 Gram(s) IV Push Once  dextrose 50% Injectable 12.5 Gram(s) IV Push Once  dextrose 50% Injectable 25 Gram(s) IV Push Once  glucagon  Injectable 1 milliGRAM(s) IntraMuscular Once  heparin   Injectable 5000 Unit(s) SubCutaneous every 8 hours  insulin glargine Injectable (LANTUS) 9 Unit(s) SubCutaneous every morning  insulin lispro (ADMELOG) corrective regimen sliding scale   SubCutaneous Before meals and at bedtime  insulin lispro Injectable (ADMELOG) 3 Unit(s) SubCutaneous three times a day with meals  metoprolol tartrate 25 milliGRAM(s) Oral every 12 hours  phenylephrine    Infusion 0.3 MICROgram(s)/kG/Min (7.4 mL/Hr) IV Continuous <Continuous>    MEDICATIONS  (PRN):  acetaminophen   Tablet .. 650 milliGRAM(s) Oral every 6 hours PRN Temp greater or equal to 38C (100.4F)  ondansetron Injectable 4 milliGRAM(s) IV Push every 6 hours PRN Nausea and/or Vomiting      ALLERGIES:  Allergies    NSAIDs (Swelling)    Intolerances    codeine (Vomiting)      LABS:                        10.3   8.16  )-----------( 137      ( 31 Mar 2021 05:37 )             33.5     03-31    140  |  103  |  22  ----------------------------<  152<H>  3.6   |  23  |  0.94    Ca    8.1<L>      31 Mar 2021 08:23  Phos  3.1     03-31  Mg     2.0     -31    TPro  6.1  /  Alb  3.4  /  TBili  0.7  /  DBili  x   /  AST  36  /  ALT  11  /  AlkPhos  104  03-31    PT/INR - ( 30 Mar 2021 02:50 )   PT: 14.0 sec;   INR: 1.18          PTT - ( 30 Mar 2021 02:50 )  PTT:32.6 sec  Urinalysis Basic - ( 29 Mar 2021 19:09 )    Color: Yellow / Appearance: OTHER / S.015 / pH: x  Gluc: x / Ketone: 15 mg/dL  / Bili: Negative / Urobili: 0.2 E.U./dL   Blood: x / Protein: >=300 mg/dL / Nitrite: POSITIVE   Leuk Esterase: NEGATIVE / RBC: Many /HPF / WBC < 5 /HPF   Sq Epi: x / Non Sq Epi: 0-5 /HPF / Bacteria: Present /HPF      CAPILLARY BLOOD GLUCOSE  160 (29 Mar 2021 13:49)      POCT Blood Glucose.: 307 mg/dL (31 Mar 2021 11:15)        RADIOLOGY & ADDITIONAL TESTS: Reviewed.

## 2021-03-31 NOTE — PROGRESS NOTE ADULT - SUBJECTIVE AND OBJECTIVE BOX
Subjective:  Patient seen and examined, sitting in chair, doing well, no chest pain or SOB    PAST MEDICAL & SURGICAL HISTORY:  HTN (hypertension)    HLD (hyperlipidemia)    DM (diabetes mellitus)    Breast cancer    H/O:     S/P appendectomy    S/P dilation and curettage    H/O lumpectomy        MEDICATIONS  (STANDING):  aspirin enteric coated 81 milliGRAM(s) Oral daily  atorvastatin 80 milliGRAM(s) Oral at bedtime  chlorhexidine 2% Cloths 1 Application(s) Topical <User Schedule>  clopidogrel Tablet 75 milliGRAM(s) Oral daily  dextrose 40% Gel 15 Gram(s) Oral Once  dextrose 5%. 1000 milliLiter(s) (50 mL/Hr) IV Continuous <Continuous>  dextrose 5%. 1000 milliLiter(s) (100 mL/Hr) IV Continuous <Continuous>  dextrose 50% Injectable 25 Gram(s) IV Push Once  dextrose 50% Injectable 12.5 Gram(s) IV Push Once  dextrose 50% Injectable 25 Gram(s) IV Push Once  glucagon  Injectable 1 milliGRAM(s) IntraMuscular Once  heparin   Injectable 5000 Unit(s) SubCutaneous every 8 hours  insulin glargine Injectable (LANTUS) 9 Unit(s) SubCutaneous every morning  insulin lispro (ADMELOG) corrective regimen sliding scale   SubCutaneous Before meals and at bedtime  insulin lispro Injectable (ADMELOG) 3 Unit(s) SubCutaneous three times a day with meals  metoprolol tartrate 25 milliGRAM(s) Oral every 12 hours  phenylephrine    Infusion 0.3 MICROgram(s)/kG/Min (7.4 mL/Hr) IV Continuous <Continuous>      Vital Signs Last 24 Hrs  T(C): 36.3 (31 Mar 2021 10:05), Max: 37.9 (30 Mar 2021 14:24)  T(F): 97.4 (31 Mar 2021 10:05), Max: 100.2 (30 Mar 2021 14:24)  HR: 87 (31 Mar 2021 12:00) (77 - 111)  BP: 131/58 (31 Mar 2021 12:00) (101/49 - 233/103)  BP(mean): 84 (31 Mar 2021 12:00) (70 - 148)  RR: 19 (31 Mar 2021 12:00) (0 - 33)  SpO2: 100% (31 Mar 2021 12:00) (97% - 100%)    Daily     Daily     Physical Exam:   GEN: NAD, AAOx3  HEENT: MMM, no icterus  CV: S1 S2 RRR, no MRG  Lung: CTAB  Abd: soft NT ND +BS  Ext: no c/c/e  Neuro: no focal neuro deficit      LABS:                        10.3   8.16  )-----------( 137      ( 31 Mar 2021 05:37 )             33.5     -    140  |  103  |  22  ----------------------------<  152<H>  3.6   |  23  |  0.94    Ca    8.1<L>      31 Mar 2021 08:23  Phos  3.1       Mg     2.0         TPro  6.1  /  Alb  3.4  /  TBili  0.7  /  DBili  x   /  AST  36  /  ALT  11  /  AlkPhos  104  03-        PT/INR - ( 30 Mar 2021 02:50 )   PT: 14.0 sec;   INR: 1.18          PTT - ( 30 Mar 2021 02:50 )  PTT:32.6 sec    I&O's Detail    30 Mar 2021 07:01  -  31 Mar 2021 07:00  --------------------------------------------------------  IN:    IV PiggyBack: 165 mL    Phenylephrine: 194.4 mL    Phenylephrine: 165.9 mL    Phenylephrine: 40 mL    Phenylephrine: 55 mL  Total IN: 620.3 mL    OUT:    Voided (mL): 200 mL  Total OUT: 200 mL    Total NET: 420.3 mL      31 Mar 2021 07:01  -  31 Mar 2021 12:42  --------------------------------------------------------  IN:  Total IN: 0 mL    OUT:    Phenylephrine: 0 mL  Total OUT: 0 mL    Total NET: 0 mL              RADIOLOGY & ADDITIONAL STUDIES:

## 2021-03-31 NOTE — PROGRESS NOTE ADULT - ATTENDING COMMENTS
Initial attending contact date  3/29/21. See fellow note written above for details. I reviewed the fellow documentation. I have personally seen and examined this patient. I reviewed vitals, labs, medications, cardiac studies, and additional imaging. I agree with the above fellow's findings and plans as written above with the following additions/statements.      76 F w/ hx of CAD, HTN, DM, HLD, TIA (2011), PVD, Breat CA (s/p chemo/radiation) admitted to stroke ICU for acute CVA post cardiac catheterization s/p ABDOULAYE mLAD, OM2, pRCA. Cardiology consulted for continued management of coronary artery disease.     -CAD status stable: cont ASA/plavix/statin/metoprolol tartrate 25 mg po bid. Can transition to toprol XL 50 mg qd upon DC  -CVA in distribution of R MCA: today with sig clinical improvement, AAO x 3, alert and talkative with 5/5 strength all 4 extremities.   -BP now controlled off phenyl  -Neuro following  -ECHO: with normal LVEF, without interatrial shunt  -Will cont to follow

## 2021-03-31 NOTE — PROGRESS NOTE ADULT - ASSESSMENT
76 F w/ hx of CAD, HTN, DM, HLD, TIA (2011), PVD, Breat CA (s/p chemo/radiation) admitted to stroke ICU for acute CVA post cardiac catheterization. Cardiology consulted for continued management of coronary artery disease.     #Coronary Artery Disease: [facility] 03/29. s/p ABDOULAYE mLAD, s/p ABDOULAYE OM2, s/p shockwave/ABDOULAYE pRCA.   - c/w ASA 81mg PO daily and Plavix 75mg PO daily   - c/w Lipitor 80mg qhs   - Transition her Lopressor to Toprol XL 50mg PO once daily   - LV systolic function normal     #HTN: better controlled, off Phenylephrine     #CVA:   - managed by stroke team  - No inter atrial shunt noted on TTE    Discussed w/ primary team   Cardiology will continue to follow along  Please call us directly for any questions or concerns

## 2021-03-31 NOTE — PROGRESS NOTE ADULT - SUBJECTIVE AND OBJECTIVE BOX
Neurology Stroke Progress Note    INTERVAL HPI/OVERNIGHT EVENTS:  Patient seen and examined this morning. patient sitting up in bed eating breakfast. No complaints. States that she is feeling well. Overnight patient's pressors were held due to SBP of 230, she was given IV labetalol. Due to patient's neurological exam stable at BP of 120, relaxed BP goals to >120.   Denies headache, weakness, numbness, difficulty with speech.      MEDICATIONS  (STANDING):  anastrozole 1 milliGRAM(s) Oral daily  aspirin enteric coated 81 milliGRAM(s) Oral daily  atorvastatin 80 milliGRAM(s) Oral at bedtime  chlorhexidine 2% Cloths 1 Application(s) Topical <User Schedule>  clopidogrel Tablet 75 milliGRAM(s) Oral daily  dextrose 40% Gel 15 Gram(s) Oral Once  dextrose 5%. 1000 milliLiter(s) (50 mL/Hr) IV Continuous <Continuous>  dextrose 5%. 1000 milliLiter(s) (100 mL/Hr) IV Continuous <Continuous>  dextrose 50% Injectable 25 Gram(s) IV Push Once  dextrose 50% Injectable 12.5 Gram(s) IV Push Once  dextrose 50% Injectable 25 Gram(s) IV Push Once  glucagon  Injectable 1 milliGRAM(s) IntraMuscular Once  heparin   Injectable 5000 Unit(s) SubCutaneous every 8 hours  insulin glargine Injectable (LANTUS) 9 Unit(s) SubCutaneous every morning  insulin lispro (ADMELOG) corrective regimen sliding scale   SubCutaneous Before meals and at bedtime  insulin lispro Injectable (ADMELOG) 3 Unit(s) SubCutaneous three times a day with meals  metoprolol tartrate 25 milliGRAM(s) Oral every 12 hours  phenylephrine    Infusion 0.3 MICROgram(s)/kG/Min (7.4 mL/Hr) IV Continuous <Continuous>  potassium chloride   Powder 40 milliEquivalent(s) Oral once    MEDICATIONS  (PRN):  acetaminophen   Tablet .. 650 milliGRAM(s) Oral every 6 hours PRN Temp greater or equal to 38C (100.4F)  ondansetron Injectable 4 milliGRAM(s) IV Push every 6 hours PRN Nausea and/or Vomiting      Allergies    NSAIDs (Swelling)    Intolerances    codeine (Vomiting)      ROS: As per HPI, otherwise negative    Vital Signs Last 24 Hrs  T(C): 36.3 (31 Mar 2021 10:05), Max: 38.2 (30 Mar 2021 12:00)  T(F): 97.4 (31 Mar 2021 10:05), Max: 100.8 (30 Mar 2021 12:00)  HR: 88 (31 Mar 2021 08:00) (77 - 123)  BP: 137/64 (31 Mar 2021 08:00) (101/49 - 233/103)  BP(mean): 91 (31 Mar 2021 08:00) (70 - 148)  RR: 18 (31 Mar 2021 08:00) (7 - 33)  SpO2: 100% (31 Mar 2021 08:00) (97% - 100%)    Physical exam:  General: awake and alert, sitting comfortably, no acute distress    Neurologic:  Mental status: awake, alert, oriented to person, month, year, [place]. Able to name color blue, ring, key. Speech is fluent. Follows commands. Attention/concentration intact. No dysarthria, no aphasia.  Cranial nerves:   II: pupils equally round and reactive to light,   III, IV, VI: EOMI without nystagmus  V:  V1-V3 sensation intact,   VII: no facial droop, facie is symmetric with normal eye closure and smile  VIII: hearing is intact to finger rub  IX, X: Uvula is midline and soft palate rises symmetrically  XI: head turning and shoulder shrug are intact.  XII: tongue midline  Motor: Normal bulk and tone, strength 5/5 in b/l UE and LE,  strength 5/5.  Sensation: intact to light touch. No neglect. Now attending to left side.  Coordination: No dysmetria on finger-to-nose bilaterally. Slight tremor in the left arm   Reflexes: downgoing toes bilaterally    LABS:                        10.3   8.16  )-----------( 137      ( 31 Mar 2021 05:37 )             33.5     03-31    140  |  103  |  22  ----------------------------<  152<H>  3.6   |  23  |  0.94    Ca    8.1<L>      31 Mar 2021 08:23  Phos  3.1     03-31  Mg     2.0     03-31    TPro  6.1  /  Alb  3.4  /  TBili  0.7  /  DBili  x   /  AST  36  /  ALT  11  /  AlkPhos  104  03-31    PT/INR - ( 30 Mar 2021 02:50 )   PT: 14.0 sec;   INR: 1.18          PTT - ( 30 Mar 2021 02:50 )  PTT:32.6 sec  Urinalysis Basic - ( 29 Mar 2021 19:09 )    Color: Yellow / Appearance: OTHER / S.015 / pH: x  Gluc: x / Ketone: 15 mg/dL  / Bili: Negative / Urobili: 0.2 E.U./dL   Blood: x / Protein: >=300 mg/dL / Nitrite: POSITIVE   Leuk Esterase: NEGATIVE / RBC: Many /HPF / WBC < 5 /HPF   Sq Epi: x / Non Sq Epi: 0-5 /HPF / Bacteria: Present /HPF        RADIOLOGY & ADDITIONAL TESTS:  no new imaging, imaging reviewed.    Echocardiogram w/ Bubble and Doppler (21 @ 11:17) >  CONCLUSIONS:     1. Normal left and right ventricular size and systolic function.   2. There is mild concentric left ventricular hypertrophy.   3. Injection of agitated saline via a peripheral vein reveals no evidence of a right-to-left shunt.   4. No significant valvular disease.   5. No pericardial effusion.   6. No prior echo is available for comparison.    Assessment and Plan  74 yo F w/ a PMH of HTN, HLD, DM, TIA in  (no residual deficits), PVD, Breast CA s/p chemo/radiation (on Anastrazole) s/p cardiac cath and had 3 stents placed on 3/29.  Stroke code on 3/29 was called about 15 minutes post cardiac cath due to confusion, difficulty following commands and getting words out. HCT negative. CTA showed no large vessel occlusion. Given that patient received heparin bolus and PTT >200, tPA was not administered. Initial NIHSS 3, however, when BP was controlled to 150/80, patient was found to have some slight left sided weakness and left sided neglect which matches decreased perfusion area along the right MCA territory. Course complicated by abdominal pain, CT Abdomen pelvis ruled out abdominal bleed. Repeat CTH stable. Possible hypodensity in the cortical region of right MCA, there, it is possible that patient had a small right MCA stroke.     - Closely follow neuro checks every 1 hour  - Repeat HCT for acute changes in neuro exam  - Obtain MRI Brain without contrast  - Goal -180  - Hold all blood pressure medications, try to wean off phenylephrine while keeping blood pressures above 120  - PT/OT/SLP - dispo likely home  - Obtain Hemoglobin A1C, Lipid Profile Panel, and TSH    Secondary Stroke Prevention Medication  - continue ASA 81mg & Plavix 75mg   - continue atorvastatin to 80mg daily- cholesterol and stroke prevention   - Start heparin SQ and SCDs for DVT prophylaxis  Neurology Stroke Progress Note    INTERVAL HPI/OVERNIGHT EVENTS:  Patient seen and examined this morning. patient sitting up in bed eating breakfast. No complaints. States that she is feeling well. Overnight patient's pressors were held due to SBP of 230, she was given IV labetalol. Due to patient's neurological exam stable at BP of 120, relaxed BP goals to >120.   Denies headache, weakness, numbness, difficulty with speech.      MEDICATIONS  (STANDING):  anastrozole 1 milliGRAM(s) Oral daily  aspirin enteric coated 81 milliGRAM(s) Oral daily  atorvastatin 80 milliGRAM(s) Oral at bedtime  chlorhexidine 2% Cloths 1 Application(s) Topical <User Schedule>  clopidogrel Tablet 75 milliGRAM(s) Oral daily  dextrose 40% Gel 15 Gram(s) Oral Once  dextrose 5%. 1000 milliLiter(s) (50 mL/Hr) IV Continuous <Continuous>  dextrose 5%. 1000 milliLiter(s) (100 mL/Hr) IV Continuous <Continuous>  dextrose 50% Injectable 25 Gram(s) IV Push Once  dextrose 50% Injectable 12.5 Gram(s) IV Push Once  dextrose 50% Injectable 25 Gram(s) IV Push Once  glucagon  Injectable 1 milliGRAM(s) IntraMuscular Once  heparin   Injectable 5000 Unit(s) SubCutaneous every 8 hours  insulin glargine Injectable (LANTUS) 9 Unit(s) SubCutaneous every morning  insulin lispro (ADMELOG) corrective regimen sliding scale   SubCutaneous Before meals and at bedtime  insulin lispro Injectable (ADMELOG) 3 Unit(s) SubCutaneous three times a day with meals  metoprolol tartrate 25 milliGRAM(s) Oral every 12 hours  phenylephrine    Infusion 0.3 MICROgram(s)/kG/Min (7.4 mL/Hr) IV Continuous <Continuous>  potassium chloride   Powder 40 milliEquivalent(s) Oral once    MEDICATIONS  (PRN):  acetaminophen   Tablet .. 650 milliGRAM(s) Oral every 6 hours PRN Temp greater or equal to 38C (100.4F)  ondansetron Injectable 4 milliGRAM(s) IV Push every 6 hours PRN Nausea and/or Vomiting      Allergies    NSAIDs (Swelling)    Intolerances    codeine (Vomiting)      ROS: As per HPI, otherwise negative    Vital Signs Last 24 Hrs  T(C): 36.3 (31 Mar 2021 10:05), Max: 38.2 (30 Mar 2021 12:00)  T(F): 97.4 (31 Mar 2021 10:05), Max: 100.8 (30 Mar 2021 12:00)  HR: 88 (31 Mar 2021 08:00) (77 - 123)  BP: 137/64 (31 Mar 2021 08:00) (101/49 - 233/103)  BP(mean): 91 (31 Mar 2021 08:00) (70 - 148)  RR: 18 (31 Mar 2021 08:00) (7 - 33)  SpO2: 100% (31 Mar 2021 08:00) (97% - 100%)    Physical exam:  General: awake and alert, sitting comfortably, no acute distress    Neurologic:  Mental status: awake, alert, oriented to person, month, year, [place]. Able to name color blue, ring, key. Speech is fluent. Follows commands. Attention/concentration intact. No dysarthria, no aphasia.  Cranial nerves:   II: pupils equally round and reactive to light,   III, IV, VI: EOMI without nystagmus  V:  V1-V3 sensation intact,   VII: no facial droop, facie is symmetric with normal eye closure and smile  VIII: hearing is intact to finger rub  IX, X: Uvula is midline and soft palate rises symmetrically  XI: head turning and shoulder shrug are intact.  XII: tongue midline  Motor: Normal bulk and tone, strength 5/5 in b/l UE and LE,  strength 5/5.  Sensation: intact to light touch. No neglect. Now attending to left side.  Coordination: No dysmetria on finger-to-nose bilaterally. Slight tremor in the left arm   Reflexes: downgoing toes bilaterally    LABS:                        10.3   8.16  )-----------( 137      ( 31 Mar 2021 05:37 )             33.5     03-31    140  |  103  |  22  ----------------------------<  152<H>  3.6   |  23  |  0.94    Ca    8.1<L>      31 Mar 2021 08:23  Phos  3.1     03-31  Mg     2.0     03-31    TPro  6.1  /  Alb  3.4  /  TBili  0.7  /  DBili  x   /  AST  36  /  ALT  11  /  AlkPhos  104  03-31    PT/INR - ( 30 Mar 2021 02:50 )   PT: 14.0 sec;   INR: 1.18          PTT - ( 30 Mar 2021 02:50 )  PTT:32.6 sec  Urinalysis Basic - ( 29 Mar 2021 19:09 )    Color: Yellow / Appearance: OTHER / S.015 / pH: x  Gluc: x / Ketone: 15 mg/dL  / Bili: Negative / Urobili: 0.2 E.U./dL   Blood: x / Protein: >=300 mg/dL / Nitrite: POSITIVE   Leuk Esterase: NEGATIVE / RBC: Many /HPF / WBC < 5 /HPF   Sq Epi: x / Non Sq Epi: 0-5 /HPF / Bacteria: Present /HPF        RADIOLOGY & ADDITIONAL TESTS:  no new imaging, imaging reviewed.    Echocardiogram w/ Bubble and Doppler (21 @ 11:17) >  CONCLUSIONS:     1. Normal left and right ventricular size and systolic function.   2. There is mild concentric left ventricular hypertrophy.   3. Injection of agitated saline via a peripheral vein reveals no evidence of a right-to-left shunt.   4. No significant valvular disease.   5. No pericardial effusion.   6. No prior echo is available for comparison.    A1C 8.5  LDL 54  TSH 0.950    Assessment and Plan  76 yo F w/ a PMH of HTN, HLD, DM, TIA in  (no residual deficits), PVD, Breast CA s/p chemo/radiation (on Anastrazole) s/p cardiac cath and had 3 stents placed on 3/29.  Stroke code on 3/29 was called about 15 minutes post cardiac cath due to confusion, difficulty following commands and getting words out. HCT negative. CTA showed no large vessel occlusion. Given that patient received heparin bolus and PTT >200, tPA was not administered. Initial NIHSS 3, however, when BP was controlled to 150/80, patient was found to have some slight left sided weakness and left sided neglect which matches decreased perfusion area along the right MCA territory. Course complicated by abdominal pain, CT Abdomen pelvis ruled out abdominal bleed. Repeat CTH stable. Possible hypodensity in the cortical region of right MCA, there, it is possible that patient had a small right MCA stroke.     - Closely follow neuro checks every 1 hour  - Repeat HCT for acute changes in neuro exam  - Obtain MRI Brain without contrast  - Goal -180  - Hold all blood pressure medications, continue off phenylephrine  - PT/OT/SLP - dispo likely home    Secondary Stroke Prevention Medication  - continue ASA 81mg & Plavix 75mg   - continue atorvastatin to 80mg daily- cholesterol and stroke prevention   - Start heparin SQ and SCDs for DVT prophylaxis     Patient stable for stepdown to [id number]

## 2021-04-01 ENCOUNTER — TRANSCRIPTION ENCOUNTER (OUTPATIENT)
Age: 76
End: 2021-04-01

## 2021-04-01 DIAGNOSIS — I16.1 HYPERTENSIVE EMERGENCY: ICD-10-CM

## 2021-04-01 DIAGNOSIS — E11.9 TYPE 2 DIABETES MELLITUS WITHOUT COMPLICATIONS: ICD-10-CM

## 2021-04-01 DIAGNOSIS — R63.8 OTHER SYMPTOMS AND SIGNS CONCERNING FOOD AND FLUID INTAKE: ICD-10-CM

## 2021-04-01 DIAGNOSIS — E78.5 HYPERLIPIDEMIA, UNSPECIFIED: ICD-10-CM

## 2021-04-01 DIAGNOSIS — I10 ESSENTIAL (PRIMARY) HYPERTENSION: ICD-10-CM

## 2021-04-01 DIAGNOSIS — I25.10 ATHEROSCLEROTIC HEART DISEASE OF NATIVE CORONARY ARTERY WITHOUT ANGINA PECTORIS: ICD-10-CM

## 2021-04-01 DIAGNOSIS — Z85.3 PERSONAL HISTORY OF MALIGNANT NEOPLASM OF BREAST: ICD-10-CM

## 2021-04-01 DIAGNOSIS — I63.9 CEREBRAL INFARCTION, UNSPECIFIED: ICD-10-CM

## 2021-04-01 LAB
GLUCOSE BLDC GLUCOMTR-MCNC: 168 MG/DL — HIGH (ref 70–99)
GLUCOSE BLDC GLUCOMTR-MCNC: 193 MG/DL — HIGH (ref 70–99)
GLUCOSE BLDC GLUCOMTR-MCNC: 237 MG/DL — HIGH (ref 70–99)
GLUCOSE BLDC GLUCOMTR-MCNC: 370 MG/DL — HIGH (ref 70–99)
GLUCOSE BLDC GLUCOMTR-MCNC: 38 MG/DL — CRITICAL LOW (ref 70–99)
GLUCOSE BLDC GLUCOMTR-MCNC: 39 MG/DL — CRITICAL LOW (ref 70–99)
GLUCOSE BLDC GLUCOMTR-MCNC: 43 MG/DL — CRITICAL LOW (ref 70–99)
GLUCOSE BLDC GLUCOMTR-MCNC: 454 MG/DL — CRITICAL HIGH (ref 70–99)
GLUCOSE BLDC GLUCOMTR-MCNC: 468 MG/DL — CRITICAL HIGH (ref 70–99)
GLUCOSE BLDC GLUCOMTR-MCNC: 515 MG/DL — CRITICAL HIGH (ref 70–99)

## 2021-04-01 PROCEDURE — 99255 IP/OBS CONSLTJ NEW/EST HI 80: CPT | Mod: GC

## 2021-04-01 PROCEDURE — 99233 SBSQ HOSP IP/OBS HIGH 50: CPT

## 2021-04-01 RX ORDER — ATORVASTATIN CALCIUM 80 MG/1
1 TABLET, FILM COATED ORAL
Qty: 30 | Refills: 0
Start: 2021-04-01 | End: 2021-04-30

## 2021-04-01 RX ORDER — INSULIN GLARGINE 100 [IU]/ML
12 INJECTION, SOLUTION SUBCUTANEOUS EVERY MORNING
Refills: 0 | Status: DISCONTINUED | OUTPATIENT
Start: 2021-04-02 | End: 2021-04-02

## 2021-04-01 RX ORDER — LISINOPRIL 2.5 MG/1
10 TABLET ORAL DAILY
Refills: 0 | Status: DISCONTINUED | OUTPATIENT
Start: 2021-04-01 | End: 2021-04-02

## 2021-04-01 RX ORDER — POTASSIUM PHOSPHATE, MONOBASIC POTASSIUM PHOSPHATE, DIBASIC 236; 224 MG/ML; MG/ML
15 INJECTION, SOLUTION INTRAVENOUS ONCE
Refills: 0 | Status: COMPLETED | OUTPATIENT
Start: 2021-04-01 | End: 2021-04-01

## 2021-04-01 RX ORDER — ROSUVASTATIN CALCIUM 5 MG/1
1 TABLET ORAL
Qty: 0 | Refills: 0 | DISCHARGE

## 2021-04-01 RX ORDER — DEXTROSE 50 % IN WATER 50 %
50 SYRINGE (ML) INTRAVENOUS ONCE
Refills: 0 | Status: COMPLETED | OUTPATIENT
Start: 2021-04-01 | End: 2021-04-01

## 2021-04-01 RX ORDER — METOPROLOL TARTRATE 50 MG
50 TABLET ORAL DAILY
Refills: 0 | Status: DISCONTINUED | OUTPATIENT
Start: 2021-04-01 | End: 2021-04-02

## 2021-04-01 RX ORDER — INSULIN HUMAN 100 [IU]/ML
5 INJECTION, SOLUTION SUBCUTANEOUS ONCE
Refills: 0 | Status: COMPLETED | OUTPATIENT
Start: 2021-04-01 | End: 2021-04-01

## 2021-04-01 RX ORDER — ASPIRIN/CALCIUM CARB/MAGNESIUM 324 MG
1 TABLET ORAL
Qty: 0 | Refills: 0 | DISCHARGE
Start: 2021-04-01

## 2021-04-01 RX ORDER — HYDRALAZINE HCL 50 MG
5 TABLET ORAL ONCE
Refills: 0 | Status: COMPLETED | OUTPATIENT
Start: 2021-04-01 | End: 2021-04-01

## 2021-04-01 RX ORDER — INSULIN GLARGINE 100 [IU]/ML
12 INJECTION, SOLUTION SUBCUTANEOUS AT BEDTIME
Refills: 0 | Status: DISCONTINUED | OUTPATIENT
Start: 2021-04-01 | End: 2021-04-01

## 2021-04-01 RX ORDER — INSULIN LISPRO 100/ML
7 VIAL (ML) SUBCUTANEOUS
Refills: 0 | Status: DISCONTINUED | OUTPATIENT
Start: 2021-04-01 | End: 2021-04-02

## 2021-04-01 RX ADMIN — Medication 2: at 11:11

## 2021-04-01 RX ADMIN — POTASSIUM PHOSPHATE, MONOBASIC POTASSIUM PHOSPHATE, DIBASIC 62.5 MILLIMOLE(S): 236; 224 INJECTION, SOLUTION INTRAVENOUS at 10:22

## 2021-04-01 RX ADMIN — LISINOPRIL 10 MILLIGRAM(S): 2.5 TABLET ORAL at 17:35

## 2021-04-01 RX ADMIN — INSULIN HUMAN 5 UNIT(S): 100 INJECTION, SOLUTION SUBCUTANEOUS at 01:51

## 2021-04-01 RX ADMIN — Medication 1: at 22:33

## 2021-04-01 RX ADMIN — Medication 50 MILLILITER(S): at 04:39

## 2021-04-01 RX ADMIN — Medication 50 MILLIGRAM(S): at 07:01

## 2021-04-01 RX ADMIN — Medication 7 UNIT(S): at 17:11

## 2021-04-01 RX ADMIN — ATORVASTATIN CALCIUM 80 MILLIGRAM(S): 80 TABLET, FILM COATED ORAL at 22:33

## 2021-04-01 RX ADMIN — HEPARIN SODIUM 5000 UNIT(S): 5000 INJECTION INTRAVENOUS; SUBCUTANEOUS at 14:12

## 2021-04-01 RX ADMIN — Medication 5 UNIT(S): at 07:33

## 2021-04-01 RX ADMIN — CLOPIDOGREL BISULFATE 75 MILLIGRAM(S): 75 TABLET, FILM COATED ORAL at 12:09

## 2021-04-01 RX ADMIN — Medication 5 MILLIGRAM(S): at 16:39

## 2021-04-01 RX ADMIN — Medication 6: at 16:24

## 2021-04-01 RX ADMIN — HEPARIN SODIUM 5000 UNIT(S): 5000 INJECTION INTRAVENOUS; SUBCUTANEOUS at 22:33

## 2021-04-01 RX ADMIN — Medication 81 MILLIGRAM(S): at 12:09

## 2021-04-01 NOTE — DISCHARGE NOTE PROVIDER - HOSPITAL COURSE
#Discharge: do not delete    Ms. Paul is a 75 yo F w/ a PMH of HTN, HLD, DM, TIA in 2011 (no residual deficits), PVD, Breast CA s/p chemo/radiation (on Anastrazole), CAD s/p cardiac cath and had 3 stents placed on 3/29.  Stroke code was called about 15 minutes post cardiac cath due to confusion, difficulty following commands and getting words out. HCT negative, TTE negative for shunt. CTA showed no large vessel occlusion. Given that patient received heparin bolus and PTT >200, tPA was not administered. Initial NIHSS 3, however, when BP was controlled to 150/80, patient was found to have some slight left sided weakness and left sided neglect which matches decreased perfusion area along the right MCA territory. Neuro deficits have since resolved.    Main Diagnoses/Inpatient treatment course:     #CVA (cerebral vascular accident).    Stroke code was called about 15 minutes post cardiac cath due to confusion, difficulty following commands and getting words out. HCT negative. CTA showed no large vessel occlusion. Given that patient received heparin bolus and PTT >200, tPA was not administered. Initial NIHSS 3, however, when BP was controlled to 150/80, patient was found to have some slight left sided weakness and left sided neglect which matches decreased perfusion area along the right MCA territory.  -TTE negative for LV/RV dysfunction and shunt   -Briefly on phenylephrine gtt to keep BPs higher, however now d/c   Plan:   - MRI Brain: Foci of diffusion restriction in the left cerebellar hemisphere and right posterior parietal lobe consistent with acute ischemia. Scattered T2 FLAIR white matter hyperintensities consistent with chronic small vessel ischemic disease.  - Goal SBP < 180, allow permissive hypertension. Per cardio, can give IV hydral 10mg PRN for SBP>180  - PT preliminary recs = home. OT recommends home with no needs   - A1C 8.5%, lipid profile normal, TSH normal   - continue ASA 81mg & Plavix 75mg   - Increased atorvastatin to 80mg daily- cholesterol and stroke prevention   - Start heparin SQ and SCDs for DVT prophylaxis.     #CAD (coronary artery disease)  - s/p cardiac cath and had 3 stents placed  - On ASA 81 and plavix 75  - Metoprolol succinate 50mg daily per cardiology.    #HTN  - Home meds: lisinopril 10 and Toprol 100  - c/w toprol 50mg qd as per cardiology.  - during admission, patient had hypertensive emergency w/ bp in 240s/120s with retching and vomiting shortly after arrival to MICU, given labetalol with good response    #HLD (hyperlipidemia).    - on home rosuvastatin 10  - c/w atorvastatin 80 inpatient & upon discharge    #Diabetes.   - A1C 8.5%  - on home metformin 500 BID, januvia 100 qd  - on Novolog 4 at lunch, tresiba 14 daily  - Lantus 9, lispro 3 TID + MISS inpatient.    #History of breast cancer  - Patient is on anastrazole 1q day   - Hold anastrazole at this time i/s/o ruling out stroke   - Please resume on discharge if cleared by neuro.       New medications:   Labs to be followed outpatient:   Exam to be followed outpatient: #Discharge: do not delete    Ms. Paul is a 75 yo F w/ a PMH of HTN, HLD, DM, TIA in 2011 (no residual deficits), PVD, Breast CA s/p chemo/radiation (on Anastrazole), CAD s/p cardiac cath and had 3 stents placed on 3/29.  Stroke code was called about 15 minutes post cardiac cath due to confusion, difficulty following commands and getting words out. HCT negative, TTE negative for shunt. CTA showed no large vessel occlusion. Given that patient received heparin bolus and PTT >200, tPA was not administered. Initial NIHSS 3, however, when BP was controlled to 150/80, patient was found to have some slight left sided weakness and left sided neglect which matches decreased perfusion area along the right MCA territory. Neuro deficits have since resolved.    Main Diagnoses/Inpatient treatment course:     #CVA (cerebral vascular accident).    Stroke code was called about 15 minutes post cardiac cath due to confusion, difficulty following commands and getting words out. HCT negative. CTA showed no large vessel occlusion. Given that patient received heparin bolus and PTT >200, tPA was not administered. Initial NIHSS 3, however, when BP was controlled to 150/80, patient was found to have some slight left sided weakness and left sided neglect which matches decreased perfusion area along the right MCA territory.  -TTE negative for LV/RV dysfunction and shunt   -Briefly on phenylephrine gtt to keep BPs higher, however now d/c   Plan:   - MRI Brain: Foci of diffusion restriction in the left cerebellar hemisphere and right posterior parietal lobe consistent with acute ischemia. Scattered T2 FLAIR white matter hyperintensities consistent with chronic small vessel ischemic disease.  - Goal SBP < 180, allow permissive hypertension. Per cardio, can give IV hydral 10mg PRN for SBP>180  - PT preliminary recs = home. OT recommends home with no needs   - A1C 8.5%, lipid profile normal, TSH normal   - continue ASA 81mg & Plavix 75mg   - Increased atorvastatin to 80mg daily- cholesterol and stroke prevention   - Start heparin SQ and SCDs for DVT prophylaxis.     #CAD (coronary artery disease)  - s/p cardiac cath and had 3 stents placed  - On ASA 81 and plavix 75  - Metoprolol succinate 50mg daily per cardiology.    #HTN  - Home meds: lisinopril 10 and Toprol 100  - c/w toprol 50mg qd as per cardiology.  - during admission, patient had hypertensive emergency w/ bp in 240s/120s with retching and vomiting shortly after arrival to MICU, given labetalol with good response    #HLD (hyperlipidemia).    - on home rosuvastatin 10  - c/w atorvastatin 80 inpatient & upon discharge    #Diabetes.   - A1C 8.5%  - on home metformin 500 BID, januvia 100 qd  - on Novolog 4 at lunch, tresiba 14 daily  - Lantus 9, lispro 3 TID + MISS inpatient.    #History of breast cancer  - Patient is on anastrazole 1q day   - Hold anastrazole at this time i/s/o ruling out stroke   - Please resume on discharge if cleared by neuro.       NIHSS at discharge: 0  New medications:   Labs to be followed outpatient:   Exam to be followed outpatient: #Discharge: do not delete    Ms. Paul is a 75 yo F w/ a PMH of HTN, HLD, DM, TIA in 2011 (no residual deficits), PVD, Breast CA s/p chemo/radiation (on Anastrazole), CAD s/p cardiac cath and had 3 stents placed on 3/29.  Stroke code was called about 15 minutes post cardiac cath due to confusion, difficulty following commands and getting words out. HCT negative, TTE negative for shunt. CTA showed no large vessel occlusion. Given that patient received heparin bolus and PTT >200, tPA was not administered. Initial NIHSS 3, however, when BP was controlled to 150/80, patient was found to have some slight left sided weakness and left sided neglect which matches decreased perfusion area along the right MCA territory. Neuro deficits have since resolved.    Main Diagnoses/Inpatient treatment course:     #CVA (cerebral vascular accident).    Stroke code was called about 15 minutes post cardiac cath due to confusion, difficulty following commands and getting words out. HCT negative. CTA showed no large vessel occlusion. Given that patient received heparin bolus and PTT >200, tPA was not administered. Initial NIHSS 3, however, when BP was controlled to 150/80, patient was found to have some slight left sided weakness and left sided neglect which matches decreased perfusion area along the right MCA territory.  -TTE negative for LV/RV dysfunction and shunt   -Briefly on phenylephrine gtt to keep BPs higher, however now d/c   Plan:   - MRI Brain: Foci of diffusion restriction in the left cerebellar hemisphere and right posterior parietal lobe consistent with acute ischemia. Scattered T2 FLAIR white matter hyperintensities consistent with chronic small vessel ischemic disease.  - Goal SBP < 180, allow permissive hypertension. Per cardio, can give IV hydral 10mg PRN for SBP>180  - PT preliminary recs = home. OT recommends home with no needs   - A1C 8.5%, lipid profile normal, TSH normal   - continue ASA 81mg & Plavix 75mg   - Increased atorvastatin to 80mg daily- cholesterol and stroke prevention   - Start heparin SQ and SCDs for DVT prophylaxis.     #CAD (coronary artery disease)  - s/p cardiac cath and had 3 stents placed  - On ASA 81 and plavix 75  - Metoprolol succinate 50mg daily per cardiology.    #HTN  - Home meds: lisinopril 10 and Toprol 100  - c/w toprol 50mg qd as per cardiology.  - during admission, patient had hypertensive emergency w/ bp in 240s/120s with retching and vomiting shortly after arrival to MICU, given labetalol with good response    #HLD (hyperlipidemia).    - on home rosuvastatin 10  - c/w atorvastatin 80 inpatient & upon discharge    #Diabetes.   - A1C 8.5%  - on home metformin 500 BID, januvia 100 qd  - on Novolog 4 at lunch, tresiba 14 daily  - Lantus 9, lispro 3 TID + MISS inpatient.    #History of breast cancer  - Patient is on anastrazole 1q day   - Hold anastrazole at this time i/s/o ruling out stroke   - resume on discharge      NIHSS at discharge: 0  New medications: none  Labs to be followed outpatient: none  Exam to be followed outpatient: none

## 2021-04-01 NOTE — PROGRESS NOTE ADULT - PROBLEM SELECTOR PLAN 6
A1C 8.5%  - on home metformin 500 BID, januvia 100 qd  - on Novolog 4 at lunch, tresiba 14 daily  - Lantus 9, lispro 3 TID + MISS inpatient

## 2021-04-01 NOTE — PROGRESS NOTE ADULT - PROBLEM SELECTOR PLAN 4
- on home lisinopril 10 and Toprol 100  - hold for now in the setting of allowing permissive hypertension, restart when patient is able to tolerate Home meds: lisinopril 10 and Toprol 100  - c/w toprol 50mg qd as per cardiology

## 2021-04-01 NOTE — PROGRESS NOTE ADULT - SUBJECTIVE AND OBJECTIVE BOX
TRANSFER STEPDOWN NOTE 7 EA TO [address] [place] [address] COURSE:   75 y/o F w/ a PMHx of HTN, HLD, DM, TIA in  (no residual deficits), PVD, Breast CA s/p chemo/radiation (on Anastrazole), CAD s/p cardiac cath and had 3 stents placed on 3/29.  Stroke code was called about 15 minutes post cardiac cath due to confusion, difficulty following commands and getting words out. HCT negative. CTA showed no large vessel occlusion. Given that patient received heparin bolus and PTT >200, tPA was not administered. Initial NIHSS 3, however, when BP was controlled to 150/80, patient was found to have some slight left sided weakness and left sided neglect which matches decreased perfusion area along the right MCA territory. TTE negative for shunt, normal RV and LV function. Patient briefly on phenylephrine gtt for keep pressures 120-180, however gtt d/ca and now allowing permissive hypertension. Patient has no weakness or neglect on exam currently, medically stable for stepdown to [facility] under neurology.     OVERNIGHT EVENTS: PEYTON.     SUBJECTIVE / INTERVAL HPI: Patient seen and examined at bedside. Patient denies any blurry vision, headache, chest pain, SOB, abdominal pain, dysuria, etc.     VITAL SIGNS:  Vital Signs Last 24 Hrs  T(C): 36.3 (31 Mar 2021 10:05), Max: 37.9 (30 Mar 2021 14:24)  T(F): 97.4 (31 Mar 2021 10:05), Max: 100.2 (30 Mar 2021 14:24)  HR: 87 (31 Mar 2021 12:00) (77 - 111)  BP: 131/58 (31 Mar 2021 12:00) (101/49 - 233/103)  BP(mean): 84 (31 Mar 2021 12:00) (70 - 148)  RR: 19 (31 Mar 2021 12:00) (0 - 33)  SpO2: 100% (31 Mar 2021 12:00) (97% - 100%)    21 @ 07:01  -  21 @ 07:00  --------------------------------------------------------  IN: 620.3 mL / OUT: 200 mL / NET: 420.3 mL    21 @ 07:01  -  21 @ 12:38  --------------------------------------------------------  IN: 0 mL / OUT: 0 mL / NET: 0 mL    PHYSICAL EXAM:    General: WDWN AA female resting in bed in NAD  HEENT: PERRLA, EOMI bilaterally, MMM  Neck: supple, non-tender, no JVD  Cardiovascular: +S1/S2; RRR, no murmurs, rubs, gallops   Respiratory: CTA B/L; no W/R/R  Gastrointestinal: soft, NT/ND; +BSx4  Extremities: WWP; no edema, clubbing or cyanosis  Vascular: 2+ radial, DP/PT pulses B/L  Neurological: AAOx3; no focal deficits. No pronator drift, neglect, weakness or decreased sensation bilaterally.     MEDICATIONS:  MEDICATIONS  (STANDING):  aspirin enteric coated 81 milliGRAM(s) Oral daily  atorvastatin 80 milliGRAM(s) Oral at bedtime  chlorhexidine 2% Cloths 1 Application(s) Topical <User Schedule>  clopidogrel Tablet 75 milliGRAM(s) Oral daily  dextrose 40% Gel 15 Gram(s) Oral Once  dextrose 5%. 1000 milliLiter(s) (50 mL/Hr) IV Continuous <Continuous>  dextrose 5%. 1000 milliLiter(s) (100 mL/Hr) IV Continuous <Continuous>  dextrose 50% Injectable 25 Gram(s) IV Push Once  dextrose 50% Injectable 12.5 Gram(s) IV Push Once  dextrose 50% Injectable 25 Gram(s) IV Push Once  glucagon  Injectable 1 milliGRAM(s) IntraMuscular Once  heparin   Injectable 5000 Unit(s) SubCutaneous every 8 hours  insulin glargine Injectable (LANTUS) 9 Unit(s) SubCutaneous every morning  insulin lispro (ADMELOG) corrective regimen sliding scale   SubCutaneous Before meals and at bedtime  insulin lispro Injectable (ADMELOG) 3 Unit(s) SubCutaneous three times a day with meals  metoprolol tartrate 25 milliGRAM(s) Oral every 12 hours  phenylephrine    Infusion 0.3 MICROgram(s)/kG/Min (7.4 mL/Hr) IV Continuous <Continuous>    MEDICATIONS  (PRN):  acetaminophen   Tablet .. 650 milliGRAM(s) Oral every 6 hours PRN Temp greater or equal to 38C (100.4F)  ondansetron Injectable 4 milliGRAM(s) IV Push every 6 hours PRN Nausea and/or Vomiting      ALLERGIES:  Allergies    NSAIDs (Swelling)    Intolerances    codeine (Vomiting)      LABS:                        10.3   8.16  )-----------( 137      ( 31 Mar 2021 05:37 )             33.5     03-31    140  |  103  |  22  ----------------------------<  152<H>  3.6   |  23  |  0.94    Ca    8.1<L>      31 Mar 2021 08:23  Phos  3.1     03-31  Mg     2.0     -31    TPro  6.1  /  Alb  3.4  /  TBili  0.7  /  DBili  x   /  AST  36  /  ALT  11  /  AlkPhos  104  03-31    PT/INR - ( 30 Mar 2021 02:50 )   PT: 14.0 sec;   INR: 1.18          PTT - ( 30 Mar 2021 02:50 )  PTT:32.6 sec  Urinalysis Basic - ( 29 Mar 2021 19:09 )    Color: Yellow / Appearance: OTHER / S.015 / pH: x  Gluc: x / Ketone: 15 mg/dL  / Bili: Negative / Urobili: 0.2 E.U./dL   Blood: x / Protein: >=300 mg/dL / Nitrite: POSITIVE   Leuk Esterase: NEGATIVE / RBC: Many /HPF / WBC < 5 /HPF   Sq Epi: x / Non Sq Epi: 0-5 /HPF / Bacteria: Present /HPF      CAPILLARY BLOOD GLUCOSE  160 (29 Mar 2021 13:49)      POCT Blood Glucose.: 307 mg/dL (31 Mar 2021 11:15)        RADIOLOGY & ADDITIONAL TESTS: Reviewed.

## 2021-04-01 NOTE — PROGRESS NOTE ADULT - PROBLEM SELECTOR PLAN 8
F: None   E: Replete for K<4 Mag<2  N: DASH/TLC  A: HSQ every 8 hours   Code status: full  Dispo:  [facility]

## 2021-04-01 NOTE — DISCHARGE NOTE PROVIDER - NSDCCPCAREPLAN_GEN_ALL_CORE_FT
PRINCIPAL DISCHARGE DIAGNOSIS  Diagnosis: CVA (cerebrovascular accident)  Assessment and Plan of Treatment: You were admitted for the placement of 3 stents in the vessels of your heart. Shortly after the procedure, you had difficulties with talking and following commands, followed by weakness and neglect of your left side; consistent with a stroke for which you were treated with blood thinners. Please continue taking the medications we newly started on as prescribed. If you experience any blurry vision, persistent headaches, numbness, weakness, confusion, please go to your nearest ED.       PRINCIPAL DISCHARGE DIAGNOSIS  Diagnosis: CVA (cerebrovascular accident)  Assessment and Plan of Treatment: You were admitted for the placement of 3 stents in the vessels of your heart. Shortly after the procedure, you had difficulties with talking and following commands, followed by weakness and neglect of your left side; consistent with a stroke for which you were treated with blood thinners. Please continue taking the medications we newly started on as prescribed. Please follow up with Dr. Irvin within the next 1-2 weeks. If you experience any blurry vision, persistent headaches, numbness, weakness, confusion, please go to your nearest ED.       PRINCIPAL DISCHARGE DIAGNOSIS  Diagnosis: CVA (cerebrovascular accident)  Assessment and Plan of Treatment: You were admitted for the placement of 3 stents in the vessels of your heart. Shortly after the procedure, you had difficulties with talking and following commands, followed by weakness and neglect of your left side; consistent with an ischemic stroke for which you were treated with blood thinners. Please take your aspirin and plavix for blood thinning and Atorvastatin for cholesterol/blood vessel protection as prescribed to prevent further strokes. Do not skip doses and do not run low on your medication. If you run low on your medication, please contact your doctor. Please follow up with Dr. Irvin within the next 1-2 weeks, and with neurology in 1-2 weeks.   Education: During an ischemic stroke, blood stops flowing to part of your brain because of a blockage in the blood vessel. This can damage areas in the brain that control other parts of the body.  Doing your regular tasks may be difficult after you've had a stroke, but you can learn new ways to manage your daily activities. It is normal to feel fatigue after a stroke, while some days may be worse than others, you will continue to improve.  Call 911 right away if you have any of the following symptoms of another stroke:  B: Balance: Sudden: Dizziness, loss of balance, or a sense of falling, difficulty with coordinating movement  E: Eyes: Sudden double vision or trouble seeing in one or both eyes  F: Face: Sudden uneven face  A: Arms (Legs): Sudden weakness, tingling, or loss of feeling on one side of your face or body  S: Speech: Sudden trouble talking or slurred speech, sudden difficulty understanding others  T: Time: Please call 911 right away and go to the emergency room  •Sudden, severe headache  •Blackouts or seizures          PRINCIPAL DISCHARGE DIAGNOSIS  Diagnosis: CVA (cerebrovascular accident)  Assessment and Plan of Treatment: You were admitted for the placement of 3 stents in the vessels of your heart. Shortly after the procedure, you had difficulties with talking and following commands, followed by weakness and neglect of your left side; consistent with an ischemic stroke for which you were treated with blood thinners. Please take your aspirin and plavix for blood thinning and Atorvastatin for cholesterol/blood vessel protection as prescribed to prevent further strokes. Do not skip doses and do not run low on your medication. If you run low on your medication, please contact your doctor. Please follow up with Dr. Irvin within the next 1-2 weeks, and with neurology in 1-2 weeks.   Education: During an ischemic stroke, blood stops flowing to part of your brain because of a blockage in the blood vessel. This can damage areas in the brain that control other parts of the body.  Doing your regular tasks may be difficult after you've had a stroke, but you can learn new ways to manage your daily activities. It is normal to feel fatigue after a stroke, while some days may be worse than others, you will continue to improve.  Call 911 right away if you have any of the following symptoms of another stroke:  B: Balance: Sudden: Dizziness, loss of balance, or a sense of falling, difficulty with coordinating movement  E: Eyes: Sudden double vision or trouble seeing in one or both eyes  F: Face: Sudden uneven face  A: Arms (Legs): Sudden weakness, tingling, or loss of feeling on one side of your face or body  S: Speech: Sudden trouble talking or slurred speech, sudden difficulty understanding others  T: Time: Please call 911 right away and go to the emergency room  •Sudden, severe headache  •Blackouts or seizures         SECONDARY DISCHARGE DIAGNOSES  Diagnosis: Diabetes  Assessment and Plan of Treatment: Your blood sugar levels and your HbA1c (value that calculates your average blood sugar level over the past 3 levels) are elevated. Please follow up with your primary care doctor for adjustment of your diabetes regimen.

## 2021-04-01 NOTE — CONSULT NOTE ADULT - ASSESSMENT
76 F w/ hx of CAD, HTN, DM, HLD, TIA (2011), PVD, Breat CA (s/p chemo/radiation) admitted to stroke ICU for acute CVA post cardiac catheterization. Cardiology consulted for continued management of coronary artery disease.     #Coronary Artery Disease: [facility] today 03/29. Right radial access. s/p ABDOULAYE mLAD, s/p ABDOULAYE OM2, s/p shockwave/ABDOULAYE pRCA. Right radial access. TR band in place.   - c/w ASA 81mg PO daily and Plavix 75mg PO daily   - Increase Lipitor 80mg qhs   - Please obtain daily ECG's   - Close monitoring of right radial access once TR band removed (to be removed ONLY by cardiology team)  - Post-cath CVA based on abnormal CT perfusion; will obtain MRI brain to confirm   - Please obtain transthoracic echocardiogram w/ bubble study     #HTN: above goal  - Per stroke team, will allow permissive HTN w/ goal 160-180.  - Upon evaluation, she is hypertensive w/ SBP > 240, s/p Hydralazine 10mg IVP x 1 dose.   - c/w Hydralazine 5-10mg IVP PRN for SBP > 180   - Will consider starting PO agents once stroke team allows for better BP control     Discussed w/ primary team   Cardiology will continue to follow along  Please call us directly for any questions or concerns 
per Neurology    76 yo F w/ a PMH of HTN, HLD, DM, TIA in 2011 (no residual deficits), PVD, Breast CA s/p chemo/radiation (on Anastrazole) s/p cardiac cath and had 3 stents placed on 3/29.  Stroke code on 3/29 was called about 15 minutes post cardiac cath due to confusion, difficulty following commands and getting words out. HCT negative. CTA showed no large vessel occlusion. Given that patient received heparin bolus and PTT >200, tPA was not administered. Initial NIHSS 3, however, when BP was controlled to 150/80, patient was found to have some slight left sided weakness and left sided neglect which matches decreased perfusion area along the right MCA territory. Course complicated by abdominal pain, CT Abdomen pelvis ruled out abdominal bleed. Repeat CTH stable. Possible hypodensity in the cortical region of right MCA, there, it is possible that patient had a small right MCA stroke.     - Closely follow neuro checks every 1 hour  - Repeat HCT for acute changes in neuro exam  - Obtain MRI Brain without contrast  - Obtain TTE with bubble  - Goal -180  - Hold all blood pressure medications, continue phenylephrine for now to keep blood pressures up  - Bedside Dysphagia Screen  - PT/OT/SLP   - Obtain Hemoglobin A1C, Lipid Profile Panel, and TSH    Secondary Stroke Prevention Medication  - continue ASA 81mg & Plavix 75mg -   - continue atorvastatin to 80mg daily- cholesterol and stroke prevention   - Start heparin SQ and SCDs for DVT prophylaxis 
75 yo F w/ a PMH of HTN, HLD, DM, TIA in 2011 (no residual deficits), PVD, Breast CA s/p chemo/radiation (on Anastrazole), CAD s/p cardiac cath and had 3 stents placed on 3/29.  Patient was found to have some slight left sided weakness and left sided neglect which matches decreased perfusion area along the right MCA territory.    #CVA- MR with Foci of diffusion restriction in the left cerebellar hemisphere and right posterior parietal lobe consistent with acute ischemia. TTE negative for LV/RV dysfunction and shunt   -Management per primary team   -BP goal per primary team, currently off phenyl gtt, -160s, on lisinopril at home, restart per cards/stroke team  -A1C 8.5%, lipid profile normal, TSH normal   -continue ASA 81mg & Plavix 75mg   -CW atorvastatin to 80mg daily- cholesterol and stroke prevention     #CAD: - s/p cardiac cath and had 3 stents placed  -On ASA 81 and plavix 75  -Metoprolol succinate 50mg daily per cardiology  -cardiology following, appreciate recs     #Hypertensive emergency: RESOLVED   Currently STEVEN 140s-160s  - Per cardio, can give IV hydral 10 as needed for SBP>180   - Goal SBP<180 per stroke team   - CW toprol 50QD  - On lisinopril 10mg QD at home, restart prn per stroke and cardiology team     #Diabetes: A1C 8.5%  - on home metformin 500 BID, januvia 100 qd  - on Novolog 4 at lunch, tresiba 14 daily  - Cw lantus 9 QHS, rec increasing premeal to 7units TIDWM    #History of breast cancer  -Patient is on anastrazole 1q day   -Hold anastrazole at this time i/s/o ruling out stroke   -Please resume on discharge if cleared by neuro    #Anemia  No E/O bleeding on exam at this time, denies melena, hematochezia   However on ASA, plavix   -Rec monitoring BM   -Continue to trend   -ULN MCV, can consider eval or folate, B12, iron panel     #Thrombocytopenia   no e/o bleeding   Continue to monitor, [place] while on DAPT     #Asx bacteruria  Currently asx, no leukocytosis, no fever   No need to treat with abx at this time   Continue to monitor UOP

## 2021-04-01 NOTE — PROGRESS NOTE ADULT - ASSESSMENT
per Neurology    75 yo F w/ a PMH of HTN, HLD, DM, TIA in 2011 (no residual deficits), PVD, Breast CA s/p chemo/radiation (on Anastrazole), CAD s/p cardiac cath and had 3 stents placed on 3/29.  Stroke code was called about 15 minutes post cardiac cath due to confusion, difficulty following commands and getting words out. HCT negative, TTE negative for shunt. CTA showed no large vessel occlusion. Given that patient received heparin bolus and PTT >200, tPA was not administered. Initial NIHSS 3, however, when BP was controlled to 150/80, patient was found to have some slight left sided weakness and left sided neglect which matches decreased perfusion area along the right MCA territory. Neuro deficits have since resolved and patient is medically stable [facility] service.     Problem/Plan - 1:  ·  Problem: CVA (cerebral vascular accident).  Plan: Stroke code was called about 15 minutes post cardiac cath due to confusion, difficulty following commands and getting words out. HCT negative. CTA showed no large vessel occlusion. Given that patient received heparin bolus and PTT >200, tPA was not administered. Initial NIHSS 3, however, when BP was controlled to 150/80, patient was found to have some slight left sided weakness and left sided neglect which matches decreased perfusion area along the right MCA territory.  -TTE negative for LV/RV dysfunction and shunt   -Briefly on phenylephrine gtt to keep BPs higher, however now d/c   Plan:   - Obtain MRI Brain without contrast  - Goal SBP < 180, allow permissive hypertension. Per cardio, can give IV hydral 10mg PRN for SBP>180  - PT preliminary recs = home. OT recommends home with no needs   - A1C 8.5%, lipid profile normal, TSH normal   - continue ASA 81mg & Plavix 75mg   - Increased atorvastatin to 80mg daily- cholesterol and stroke prevention   - Start heparin SQ and SCDs for DVT prophylaxis.     Problem/Plan - 2:  ·  Problem: CAD (coronary artery disease).  Plan: - s/p cardiac cath and had 3 stents placed  - On ASA 81 and plavix 75  - Metoprolol succinate 50mg daily per cardiology.    Problem/Plan - 3:  ·  Problem: Hypertensive emergency.  Plan: #Hypertensive emergency - RESOLVED   - bp in 240s/120s with retching and vomiting shortly after arrival to MICU  - given labetalol with good response  - Per cardio, can give IV hydral 10 as needed for SBP>180   - Goal SBP<180.     Problem/Plan - 4:  ·  Problem: HTN (hypertension).  Plan: Home meds: lisinopril 10 and Toprol 100  - c/w toprol 50mg qd as per cardiology.    Problem/Plan - 5:  ·  Problem: HLD (hyperlipidemia).  Plan: - on home rosuvastatin 10  - c/w atorvastatin 80 inpatient.     Problem/Plan - 6:  Problem: Diabetes. Plan: A1C 8.5%  - on home metformin 500 BID, januvia 100 qd  - on Novolog 4 at lunch, tresiba 14 daily  - Lantus 9, lispro 3 TID + MISS inpatient.    Problem/Plan - 7:  ·  Problem: History of breast cancer.  Plan: -Patient is on anastrazole 1q day   -Hold anastrazole at this time i/s/o ruling out stroke   -Please resume on discharge if cleared by neuro.     Problem/Plan - 8:  ·  Problem: Nutrition, metabolism, and development symptoms.  Plan: F: None   E: Replete for K<4 Mag<2  N: DASH/TLC  A: HSQ every 8 hours   Code status: full  Dispo: [facility].

## 2021-04-01 NOTE — DISCHARGE NOTE PROVIDER - NSDCMRMEDTOKEN_GEN_ALL_CORE_FT
anastrozole 1 mg oral tablet: 1 tab(s) orally once a day  aspirin 81 mg oral delayed release tablet: 1 tab(s) orally once a day  Calcium 600+D oral tablet: 1 tab(s) orally 2 times a day  Januvia 100 mg oral tablet: 1 tab(s) orally once a day  lisinopril 10 mg oral tablet: 1 tab(s) orally once a day  metFORMIN 500 mg oral tablet: 1 tab(s) orally 2 times a day  metoprolol succinate 100 mg oral capsule, extended release: 1 cap(s) orally once a day  NovoLOG 100 units/mL subcutaneous solution: 4 unit(s) subcutaneous once a day, @ lunch  Tresiba 100 units/mL subcutaneous solution: 14 unit(s) subcutaneous once a day, in AM   anastrozole 1 mg oral tablet: 1 tab(s) orally once a day  aspirin 81 mg oral delayed release tablet: 1 tab(s) orally once a day  atorvastatin 80 mg oral tablet: 1 tab(s) orally once a day (at bedtime)  Calcium 600+D oral tablet: 1 tab(s) orally 2 times a day  clopidogrel 75 mg oral tablet: 1 tab(s) orally once a day  Januvia 100 mg oral tablet: 1 tab(s) orally once a day  lisinopril 10 mg oral tablet: 1 tab(s) orally once a day  metFORMIN 500 mg oral tablet: 1 tab(s) orally 2 times a day  metoprolol succinate 100 mg oral capsule, extended release: 1 cap(s) orally once a day  NovoLOG 100 units/mL subcutaneous solution: 4 unit(s) subcutaneous once a day, @ lunch  Tresiba 100 units/mL subcutaneous solution: 14 unit(s) subcutaneous once a day, in AM

## 2021-04-01 NOTE — PROGRESS NOTE ADULT - PROBLEM SELECTOR PLAN 3
#Hypertensive emergency - RESOLVED   - bp in 240s/120s with retching and vomiting shortly after arrival to MICU  - given labetalol with good response  - Per cardio, can give IV hydral 10 as needed for SBP>180   - Goal SBP<180

## 2021-04-01 NOTE — PROGRESS NOTE ADULT - PROBLEM SELECTOR PLAN 7
-Patient is on anastrazole 1q day   -Hold anastrazole at this time i/s/o ruling out stroke   -Please resume on discharge if cleared by neuro

## 2021-04-01 NOTE — PROGRESS NOTE ADULT - SUBJECTIVE AND OBJECTIVE BOX
Addended by: ELIZA CANTU on: 1/7/2020 03:36 PM     Modules accepted: Orders     Subjective:  Seen and examined at bedside. Doing well, in no acute distress. No chest pain or SOB    PAST MEDICAL & SURGICAL HISTORY:  HTN (hypertension)    HLD (hyperlipidemia)    DM (diabetes mellitus)    Breast cancer    H/O:     S/P appendectomy    S/P dilation and curettage    H/O lumpectomy        MEDICATIONS  (STANDING):  aspirin enteric coated 81 milliGRAM(s) Oral daily  atorvastatin 80 milliGRAM(s) Oral at bedtime  chlorhexidine 2% Cloths 1 Application(s) Topical <User Schedule>  clopidogrel Tablet 75 milliGRAM(s) Oral daily  dextrose 40% Gel 15 Gram(s) Oral Once  dextrose 5%. 1000 milliLiter(s) (50 mL/Hr) IV Continuous <Continuous>  dextrose 5%. 1000 milliLiter(s) (100 mL/Hr) IV Continuous <Continuous>  dextrose 50% Injectable 25 Gram(s) IV Push Once  dextrose 50% Injectable 12.5 Gram(s) IV Push Once  dextrose 50% Injectable 25 Gram(s) IV Push Once  glucagon  Injectable 1 milliGRAM(s) IntraMuscular Once  heparin   Injectable 5000 Unit(s) SubCutaneous every 8 hours  insulin glargine Injectable (LANTUS) 9 Unit(s) SubCutaneous every morning  insulin lispro (ADMELOG) corrective regimen sliding scale   SubCutaneous Before meals and at bedtime  insulin lispro Injectable (ADMELOG) 5 Unit(s) SubCutaneous three times a day with meals  metoprolol succinate ER 50 milliGRAM(s) Oral daily      Vital Signs Last 24 Hrs  T(C): 36.3 (2021 13:14), Max: 36.9 (2021 01:43)  T(F): 97.4 (2021 13:14), Max: 98.4 (2021 01:43)  HR: 82 (2021 12:30) (74 - 96)  BP: 135/71 (2021 12:30) (116/69 - 168/77)  BP(mean): 97 (2021 12:30) (84 - 111)  RR: 16 (2021 12:30) (13 - 24)  SpO2: 100% (2021 12:30) (96% - 100%)    Daily     Daily     Physical Exam:   GEN: NAD, AAOx3  HEENT: MMM, no icterus  CV: S1 S2 RRR, no MRG  Lung: CTAB  Abd: soft NT ND +BS  Ext: no c/c/e  Neuro: no focal neuro deficit      LABS:                        9.4    3.80  )-----------( 134      ( 2021 07:14 )             29.6     04-    141  |  107  |  21  ----------------------------<  144<H>  3.9   |  26  |  0.86    Ca    8.4      2021 07:14  Phos  2.2     04-  Mg     2.1     -    TPro  6.0  /  Alb  3.2<L>  /  TBili  0.5  /  DBili  x   /  AST  28  /  ALT  12  /  AlkPhos  89              I&O's Detail    31 Mar 2021 07:01  -  2021 07:00  --------------------------------------------------------  IN:    Oral Fluid: 60 mL  Total IN: 60 mL    OUT:    Phenylephrine: 0 mL    Voided (mL): 300 mL  Total OUT: 300 mL    Total NET: -240 mL              RADIOLOGY & ADDITIONAL STUDIES:

## 2021-04-01 NOTE — CONSULT NOTE ADULT - SUBJECTIVE AND OBJECTIVE BOX
Patient is a 75y old  Female who presents with a chief complaint of cardiac cath (31 Mar 2021 07:35)    77 y/o F w/ a PMHx of HTN, HLD, DM, TIA in  (no residual deficits), PVD, Breast CA s/p chemo/radiation (on Anastrazole), CAD s/p cardiac cath and had 3 stents placed on 3/29.  Stroke code was called about 15 minutes post cardiac cath due to confusion, difficulty following commands and getting words out. HCT negative. CTA showed no large vessel occlusion. Given that patient received heparin bolus and PTT >200, tPA was not administered. Initial NIHSS 3, however, when BP was controlled to 150/80, patient was found to have some slight left sided weakness and left sided neglect which matches decreased perfusion area along the right MCA territory. TTE negative for shunt, normal RV and LV function. Patient briefly on phenylephrine gtt for keep pressures 120-180, however gtt d/ca and now allowing permissive hypertension. Patient has no weakness or neglect on exam currently, medically stable for stepdown to [facility] under neurology.     INTERVAL HPI/OVERNIGHT EVENTS:  Patient was seen and examined at bedside. As per nurse and patient, no o/n events, patient resting comfortably. No complaints at this time. Hoping to go home soon. Patient denies: fever, chills, dizziness, weakness, HA, Changes in vision, CP, palpitations, SOB, cough, N/V/D/C, dysuria, changes in bowel movements, LE edema.      PAST MEDICAL & SURGICAL HISTORY:  HTN (hypertension)    HLD (hyperlipidemia)    DM (diabetes mellitus)    Breast cancer    H/O:     S/P appendectomy    S/P dilation and curettage    H/O lumpectomy    FAMILY HISTORY:    REVIEW OF SYSTEMS:  CONSTITUTIONAL: No fever, weight loss, or fatigue  EYES: No eye pain, visual disturbances, or discharge  ENMT:  No difficulty hearing, tinnitus, vertigo; No sinus or throat pain  NECK: No pain or stiffness  RESPIRATORY: No cough, wheezing, chills or hemoptysis; No shortness of breath  CARDIOVASCULAR: No chest pain, palpitations, dizziness, or leg swelling  GASTROINTESTINAL: No abdominal or epigastric pain. No nausea, vomiting, or hematemesis; No diarrhea or constipation. No melena or hematochezia.  GENITOURINARY: No dysuria, frequency, hematuria, or incontinence  NEUROLOGICAL: No headaches, memory loss, loss of strength, numbness, or tremors  SKIN: No itching, burning, rashes, or lesions   LYMPH NODES: No enlarged glands  ENDOCRINE: No heat or cold intolerance; No hair loss  MUSCULOSKELETAL: No joint pain or swelling; No muscle, back, or extremity pain  PSYCHIATRIC: No depression, anxiety, mood swings, or difficulty sleeping  HEME/LYMPH: No easy bruising, or bleeding gums  ALLERY AND IMMUNOLOGIC: No hives or eczema    T(C): 36.7 (21 @ 09:47), Max: 36.9 (21 @ 01:43)  HR: 78 (21 @ 10:00) (74 - 96)  BP: 145/79 (21 @ 10:00) (116/69 - 168/77)  RR: 16 (21 @ 08:08) (13 - 41)  SpO2: 96% (21 @ 10:00) (96% - 100%)  Wt(kg): --  I&O's Summary    31 Mar 2021 07:01  -  2021 07:00  --------------------------------------------------------  IN: 60 mL / OUT: 300 mL / NET: -240 mL        PHYSICAL EXAM:  GENERAL: NAD, laying comfortably in bed  HEAD:  Atraumatic, Normocephalic  EYES: EOMI, PERRLA, conjunctiva and sclera clear  ENMT: No tonsillar erythema, exudates, or enlargement; MMM  NECK: Supple, No JVD  NERVOUS SYSTEM:  Alert & Oriented X3, no focal deficits   CHEST/LUNG: Clear to percussion bilaterally; No rales, rhonchi, wheezing, or rubs  HEART: Regular rate and rhythm; No murmurs, rubs, or gallops  ABDOMEN: Soft, Nontender, Nondistended; Bowel sounds present  EXTREMITIES:  2+ Peripheral Pulses, No clubbing, cyanosis, or edema  LYMPH: No lymphadenopathy noted  SKIN: No rashes or lesions        LABS:                        9.4    3.80  )-----------( 134      ( 2021 07:14 )             29.6     -    141  |  107  |  21  ----------------------------<  144<H>  3.9   |  26  |  0.86    Ca    8.4      2021 07:14  Phos  2.2     04-  Mg     2.1     -    TPro  6.0  /  Alb  3.2<L>  /  TBili  0.5  /  DBili  x   /  AST  28  /  ALT  12  /  AlkPhos  89  -        CAPILLARY BLOOD GLUCOSE      POCT Blood Glucose.: 237 mg/dL (2021 11:04)  POCT Blood Glucose.: 183 mg/dL (2021 07:09)  POCT Blood Glucose.: 168 mg/dL (2021 04:59)  POCT Blood Glucose.: 38 mg/dL (2021 04:33)  POCT Blood Glucose.: 43 mg/dL (2021 04:28)  POCT Blood Glucose.: 39 mg/dL (2021 04:25)  POCT Blood Glucose.: 392 mg/dL (31 Mar 2021 21:11)  POCT Blood Glucose.: 338 mg/dL (31 Mar 2021 16:52)            MEDICATIONS  (STANDING):  aspirin enteric coated 81 milliGRAM(s) Oral daily  atorvastatin 80 milliGRAM(s) Oral at bedtime  chlorhexidine 2% Cloths 1 Application(s) Topical <User Schedule>  clopidogrel Tablet 75 milliGRAM(s) Oral daily  dextrose 40% Gel 15 Gram(s) Oral Once  dextrose 5%. 1000 milliLiter(s) (50 mL/Hr) IV Continuous <Continuous>  dextrose 5%. 1000 milliLiter(s) (100 mL/Hr) IV Continuous <Continuous>  dextrose 50% Injectable 25 Gram(s) IV Push Once  dextrose 50% Injectable 12.5 Gram(s) IV Push Once  dextrose 50% Injectable 25 Gram(s) IV Push Once  glucagon  Injectable 1 milliGRAM(s) IntraMuscular Once  heparin   Injectable 5000 Unit(s) SubCutaneous every 8 hours  insulin glargine Injectable (LANTUS) 9 Unit(s) SubCutaneous every morning  insulin lispro (ADMELOG) corrective regimen sliding scale   SubCutaneous Before meals and at bedtime  insulin lispro Injectable (ADMELOG) 5 Unit(s) SubCutaneous three times a day with meals  metoprolol succinate ER 50 milliGRAM(s) Oral daily    MEDICATIONS  (PRN):  acetaminophen   Tablet .. 650 milliGRAM(s) Oral every 6 hours PRN Temp greater or equal to 38C (100.4F)  ondansetron Injectable 4 milliGRAM(s) IV Push every 6 hours PRN Nausea and/or Vomiting      RADIOLOGY & ADDITIONAL TESTS:    Imaging Personally Reviewed:  [ ] YES  [ ] NO    Consultant(s) Notes Reviewed:  [ ] YES  [ ] NO    Care Discussed with Consultants/Other Providers [ ] YES  [ ] NO

## 2021-04-01 NOTE — PROGRESS NOTE ADULT - ATTENDING COMMENTS
Initial attending contact date  3/29/21. See fellow note written above for details. I reviewed the fellow documentation. I have personally seen and examined this patient. I reviewed vitals, labs, medications, cardiac studies, and additional imaging. I agree with the above fellow's findings and plans as written above with the following additions/statements.      76 F w/ hx of CAD, HTN, DM, HLD, TIA (2011), PVD, Breat CA (s/p chemo/radiation) admitted to stroke ICU for acute CVA post cardiac catheterization s/p ABDOULAYE mLAD, OM2, pRCA. Cardiology consulted for continued management of coronary artery disease.     -CAD status stable: cont ASA/plavix/statin/metoprolol tartrate 25 mg po bid. Can transition to toprol XL 50 mg qd upon DC  -CVA in distribution of R MCA: today with sig clinical improvement, AAO x 3, alert and talkative with 5/5 strength all 4 extremities.   -BP now controlled   -Neuro following  -ECHO: with normal LVEF, without interatrial shunt  -To be dc'd on current meds to fu with Dr MARLENE Irvin for outpatient cardiology follow up

## 2021-04-01 NOTE — PROGRESS NOTE ADULT - ASSESSMENT
77 yo F w/ a PMH of HTN, HLD, DM, TIA in 2011 (no residual deficits), PVD, Breast CA s/p chemo/radiation (on Anastrazole), CAD s/p cardiac cath and had 3 stents placed on 3/29.  Stroke code was called about 15 minutes post cardiac cath due to confusion, difficulty following commands and getting words out. HCT negative, TTE negative for shunt. CTA showed no large vessel occlusion. Given that patient received heparin bolus and PTT >200, tPA was not administered. Initial NIHSS 3, however, when BP was controlled to 150/80, patient was found to have some slight left sided weakness and left sided neglect which matches decreased perfusion area along the right MCA territory. Neuro deficits have since resolved and patient is medically stable for stepdown to [facility] under neurology service.

## 2021-04-01 NOTE — PROGRESS NOTE ADULT - PROBLEM SELECTOR PLAN 1
Stroke code was called about 15 minutes post cardiac cath due to confusion, difficulty following commands and getting words out. HCT negative. CTA showed no large vessel occlusion. Given that patient received heparin bolus and PTT >200, tPA was not administered. Initial NIHSS 3, however, when BP was controlled to 150/80, patient was found to have some slight left sided weakness and left sided neglect which matches decreased perfusion area along the right MCA territory.  -TTE negative for LV/RV dysfunction and shunt   -Briefly on phenylephrine gtt to keep BPs higher, however now d/c   Plan:   - Obtain MRI Brain without contrast  - Goal SBP < 180, allow permissive hypertension. Per cardio, can give IV hydral 10mg PRN for SBP>180  - PT preliminary recs = home. OT recommends home with no needs   - A1C 8.5%, lipid profile normal, TSH normal   - continue ASA 81mg & Plavix 75mg   - Increased atorvastatin to 80mg daily- cholesterol and stroke prevention   - Start heparin SQ and SCDs for DVT prophylaxis

## 2021-04-01 NOTE — DISCHARGE NOTE PROVIDER - CARE PROVIDER_API CALL
Juan Manuel Irvin (MD)  Cardiovascular Disease; Interventional Cardiology  [address] [place] [address]  Phone: [phone]  Fax: [phone]  Follow Up Time: 2 weeks

## 2021-04-01 NOTE — PROGRESS NOTE ADULT - PROBLEM SELECTOR PLAN 2
- s/p cardiac cath and had 3 stents placed  - On ASA 81 and plavix 75  - Metoprolol tartrate 25mg BID per cardiology - s/p cardiac cath and had 3 stents placed  - On ASA 81 and plavix 75  - Metoprolol succinate 50mg daily per cardiology

## 2021-04-01 NOTE — PROGRESS NOTE ADULT - ASSESSMENT
76 F w/ hx of CAD, HTN, DM, HLD, TIA (2011), PVD, Breat CA (s/p chemo/radiation) admitted to stroke ICU for acute CVA post cardiac catheterization. Cardiology consulted for continued management of coronary artery disease.     #Coronary Artery Disease: [facility] 03/29. s/p ABDOULAYE mLAD, s/p ABDOULAYE OM2, s/p shockwave/ABDOULAYE pRCA.   - c/w ASA 81mg PO daily and Plavix 75mg PO daily   - c/w Lipitor 80mg qhs   - c/w Toprol XL 50mg PO once daily   - LV systolic function normal     HTN: above goal  - can c/w home lisinopril at discharge     #CVA:   - managed by stroke team  - No inter atrial shunt noted on TTE    Discussed w/ primary team   Please call and re-consult us for any questions or concerns

## 2021-04-01 NOTE — PROGRESS NOTE ADULT - SUBJECTIVE AND OBJECTIVE BOX
Physical Medicine and Rehabilitation Progress Note:    Patient is a 75y old  Female who presents with a chief complaint of cardiac cath (31 Mar 2021 07:35)      HPI:  Cardiologist: Dr. Juan Manuel Irvin  Escort: Daughter  Billy: *[place] Patient*  COVID: NEGATIVE 3/26 @ [facility] in [place]  *Of note traveled from [facility] on 3/22 and quarantined x 7 days*  *Hx obtained via pts daughter Onur*    75 yo F w/ a PMH of HTN, HLD, DM, TIA in 2011 (no residual deficits), PVD, Breast CA s/p chemo/radiation (on Anastrazole) who presented to outpatient cardiologist Dr. Juan Manuel Irvin for routine follow up. Pt states she is totally ASX. Denies CP, SOB, dizziness, diaphoresis, palpitations, orthopnea/PND, abdominal pain, N/V/D, urinary sx, BRBPR, hematuria, melena, LE swelling, recent travel/sick contacts/illness. CCTA 2/24/21: Ca score 1848 (90th percentile). Of note: remainder of CCTA had to be aborted because BP was too high. In light of patient’s risk factors and abnormal calcium score, patient is referred for cardiac catheterization with possible intervention if clinically indicated.    (25 Mar 2021 16:11)                            9.4    3.80  )-----------( 134      ( 01 Apr 2021 07:14 )             29.6       04-01    141  |  107  |  21  ----------------------------<  144<H>  3.9   |  26  |  0.86    Ca    8.4      01 Apr 2021 07:14  Phos  2.2     04-01  Mg     2.1     04-01    TPro  6.0  /  Alb  3.2<L>  /  TBili  0.5  /  DBili  x   /  AST  28  /  ALT  12  /  AlkPhos  89  04-01    Vital Signs Last 24 Hrs  T(C): 36.7 (01 Apr 2021 09:47), Max: 36.9 (01 Apr 2021 01:43)  T(F): 98.1 (01 Apr 2021 09:47), Max: 98.4 (01 Apr 2021 01:43)  HR: 78 (01 Apr 2021 10:00) (74 - 96)  BP: 145/79 (01 Apr 2021 10:00) (116/69 - 168/77)  BP(mean): 102 (01 Apr 2021 10:00) (80 - 111)  RR: 16 (01 Apr 2021 08:08) (13 - 41)  SpO2: 96% (01 Apr 2021 10:00) (96% - 100%)    MEDICATIONS  (STANDING):  aspirin enteric coated 81 milliGRAM(s) Oral daily  atorvastatin 80 milliGRAM(s) Oral at bedtime  chlorhexidine 2% Cloths 1 Application(s) Topical <User Schedule>  clopidogrel Tablet 75 milliGRAM(s) Oral daily  dextrose 40% Gel 15 Gram(s) Oral Once  dextrose 5%. 1000 milliLiter(s) (50 mL/Hr) IV Continuous <Continuous>  dextrose 5%. 1000 milliLiter(s) (100 mL/Hr) IV Continuous <Continuous>  dextrose 50% Injectable 25 Gram(s) IV Push Once  dextrose 50% Injectable 12.5 Gram(s) IV Push Once  dextrose 50% Injectable 25 Gram(s) IV Push Once  glucagon  Injectable 1 milliGRAM(s) IntraMuscular Once  heparin   Injectable 5000 Unit(s) SubCutaneous every 8 hours  insulin glargine Injectable (LANTUS) 9 Unit(s) SubCutaneous every morning  insulin lispro (ADMELOG) corrective regimen sliding scale   SubCutaneous Before meals and at bedtime  insulin lispro Injectable (ADMELOG) 5 Unit(s) SubCutaneous three times a day with meals  metoprolol succinate ER 50 milliGRAM(s) Oral daily    MEDICATIONS  (PRN):  acetaminophen   Tablet .. 650 milliGRAM(s) Oral every 6 hours PRN Temp greater or equal to 38C (100.4F)  ondansetron Injectable 4 milliGRAM(s) IV Push every 6 hours PRN Nausea and/or Vomiting    Currently Undergoing Physical/ Occupational Therapy at bedside.    Functional Status Assessment:   3/31/2021    Cognitive/Perceptual/Neuro  Cognitive/Neuro/Behavioral [WDL Definition: Alert; opens eyes spontaneously; arouses to voice or touch; oriented x 4; follows commands; speech spontaneous, logical; purposeful motor response; behavior appropriate to situation]: WDL    Language Assistance  Preferred Language to Address Healthcare Preferred Language to Address Healthcare: English    Therapeutic Interventions      Sit-Stand Transfer Training  Transfer Training Sit-to-Stand Transfer: independent  Transfer Training Stand-to-Sit Transfer: independent    Gait Training  Gait Training: independent;  250 feet  Gait Analysis: 2-point gait   250ft    Stair Training  Physical Assist/Nonphysical Assist: independent  Weight-Bearing Restrictions: full weight-bearing  Assistive Device: right rail up;  right rail down  Number of Stairs: 12     Therapeutic Exercise  Therapeutic Exercise Detail: LAQ, seated marches [id number] to shin coordination intact BLBLE sensation intactBLE strength WNL and equalPt able to  glove from floor ind and reach for glove overhead w/ no impairments           PM&R Impression: as above    Current Disposition Plan Recommendations:    d/c home with no post discharge rehab needs

## 2021-04-02 ENCOUNTER — TRANSCRIPTION ENCOUNTER (OUTPATIENT)
Age: 76
End: 2021-04-02

## 2021-04-02 VITALS — TEMPERATURE: 97 F

## 2021-04-02 LAB
ANION GAP SERPL CALC-SCNC: 12 MMOL/L — SIGNIFICANT CHANGE UP (ref 5–17)
BUN SERPL-MCNC: 20 MG/DL — SIGNIFICANT CHANGE UP (ref 7–23)
CALCIUM SERPL-MCNC: 8.3 MG/DL — LOW (ref 8.4–10.5)
CHLORIDE SERPL-SCNC: 104 MMOL/L — SIGNIFICANT CHANGE UP (ref 96–108)
CO2 SERPL-SCNC: 22 MMOL/L — SIGNIFICANT CHANGE UP (ref 22–31)
CREAT SERPL-MCNC: 0.86 MG/DL — SIGNIFICANT CHANGE UP (ref 0.5–1.3)
GLUCOSE BLDC GLUCOMTR-MCNC: 261 MG/DL — HIGH (ref 70–99)
GLUCOSE BLDC GLUCOMTR-MCNC: 311 MG/DL — HIGH (ref 70–99)
GLUCOSE SERPL-MCNC: 293 MG/DL — HIGH (ref 70–99)
HCT VFR BLD CALC: 31.3 % — LOW (ref 34.5–45)
HGB BLD-MCNC: 9.7 G/DL — LOW (ref 11.5–15.5)
MAGNESIUM SERPL-MCNC: 1.8 MG/DL — SIGNIFICANT CHANGE UP (ref 1.6–2.6)
MCHC RBC-ENTMCNC: 30 PG — SIGNIFICANT CHANGE UP (ref 27–34)
MCHC RBC-ENTMCNC: 31 GM/DL — LOW (ref 32–36)
MCV RBC AUTO: 96.9 FL — SIGNIFICANT CHANGE UP (ref 80–100)
NRBC # BLD: 0 /100 WBCS — SIGNIFICANT CHANGE UP (ref 0–0)
PHOSPHATE SERPL-MCNC: 2.9 MG/DL — SIGNIFICANT CHANGE UP (ref 2.5–4.5)
PLATELET # BLD AUTO: 140 K/UL — LOW (ref 150–400)
POTASSIUM SERPL-MCNC: 4.7 MMOL/L — SIGNIFICANT CHANGE UP (ref 3.5–5.3)
POTASSIUM SERPL-SCNC: 4.7 MMOL/L — SIGNIFICANT CHANGE UP (ref 3.5–5.3)
RBC # BLD: 3.23 M/UL — LOW (ref 3.8–5.2)
RBC # FLD: 14.1 % — SIGNIFICANT CHANGE UP (ref 10.3–14.5)
SARS-COV-2 RNA SPEC QL NAA+PROBE: NEGATIVE — SIGNIFICANT CHANGE UP
SODIUM SERPL-SCNC: 138 MMOL/L — SIGNIFICANT CHANGE UP (ref 135–145)
WBC # BLD: 3.58 K/UL — LOW (ref 3.8–10.5)
WBC # FLD AUTO: 3.58 K/UL — LOW (ref 3.8–10.5)

## 2021-04-02 PROCEDURE — 87635 SARS-COV-2 COVID-19 AMP PRB: CPT

## 2021-04-02 PROCEDURE — 97161 PT EVAL LOW COMPLEX 20 MIN: CPT

## 2021-04-02 PROCEDURE — 82553 CREATINE MB FRACTION: CPT

## 2021-04-02 PROCEDURE — 87086 URINE CULTURE/COLONY COUNT: CPT

## 2021-04-02 PROCEDURE — 83605 ASSAY OF LACTIC ACID: CPT

## 2021-04-02 PROCEDURE — 70498 CT ANGIOGRAPHY NECK: CPT

## 2021-04-02 PROCEDURE — 99233 SBSQ HOSP IP/OBS HIGH 50: CPT | Mod: GC

## 2021-04-02 PROCEDURE — 74018 RADEX ABDOMEN 1 VIEW: CPT

## 2021-04-02 PROCEDURE — C1725: CPT

## 2021-04-02 PROCEDURE — 0031A: CPT

## 2021-04-02 PROCEDURE — C1887: CPT

## 2021-04-02 PROCEDURE — 80053 COMPREHEN METABOLIC PANEL: CPT

## 2021-04-02 PROCEDURE — 80061 LIPID PANEL: CPT

## 2021-04-02 PROCEDURE — 84443 ASSAY THYROID STIM HORMONE: CPT

## 2021-04-02 PROCEDURE — 70450 CT HEAD/BRAIN W/O DYE: CPT

## 2021-04-02 PROCEDURE — 80048 BASIC METABOLIC PNL TOTAL CA: CPT

## 2021-04-02 PROCEDURE — 85027 COMPLETE CBC AUTOMATED: CPT

## 2021-04-02 PROCEDURE — 85610 PROTHROMBIN TIME: CPT

## 2021-04-02 PROCEDURE — 84100 ASSAY OF PHOSPHORUS: CPT

## 2021-04-02 PROCEDURE — 81001 URINALYSIS AUTO W/SCOPE: CPT

## 2021-04-02 PROCEDURE — 85730 THROMBOPLASTIN TIME PARTIAL: CPT

## 2021-04-02 PROCEDURE — 82962 GLUCOSE BLOOD TEST: CPT

## 2021-04-02 PROCEDURE — 70551 MRI BRAIN STEM W/O DYE: CPT

## 2021-04-02 PROCEDURE — 85025 COMPLETE CBC W/AUTO DIFF WBC: CPT

## 2021-04-02 PROCEDURE — 97535 SELF CARE MNGMENT TRAINING: CPT

## 2021-04-02 PROCEDURE — 70496 CT ANGIOGRAPHY HEAD: CPT

## 2021-04-02 PROCEDURE — 83036 HEMOGLOBIN GLYCOSYLATED A1C: CPT

## 2021-04-02 PROCEDURE — 93306 TTE W/DOPPLER COMPLETE: CPT

## 2021-04-02 PROCEDURE — 87040 BLOOD CULTURE FOR BACTERIA: CPT

## 2021-04-02 PROCEDURE — 0042T: CPT

## 2021-04-02 PROCEDURE — C1769: CPT

## 2021-04-02 PROCEDURE — 71045 X-RAY EXAM CHEST 1 VIEW: CPT

## 2021-04-02 PROCEDURE — 97530 THERAPEUTIC ACTIVITIES: CPT

## 2021-04-02 PROCEDURE — 97116 GAIT TRAINING THERAPY: CPT

## 2021-04-02 PROCEDURE — C1874: CPT

## 2021-04-02 PROCEDURE — 82550 ASSAY OF CK (CPK): CPT

## 2021-04-02 PROCEDURE — C1894: CPT

## 2021-04-02 PROCEDURE — 97110 THERAPEUTIC EXERCISES: CPT

## 2021-04-02 PROCEDURE — 74176 CT ABD & PELVIS W/O CONTRAST: CPT

## 2021-04-02 PROCEDURE — 36415 COLL VENOUS BLD VENIPUNCTURE: CPT

## 2021-04-02 PROCEDURE — 83735 ASSAY OF MAGNESIUM: CPT

## 2021-04-02 RX ORDER — CLOPIDOGREL BISULFATE 75 MG/1
1 TABLET, FILM COATED ORAL
Qty: 0 | Refills: 0 | DISCHARGE
Start: 2021-04-02

## 2021-04-02 RX ORDER — JNJ-78436735 50000000000 [PFU]/.5ML
0.5 SUSPENSION INTRAMUSCULAR ONCE
Refills: 0 | Status: COMPLETED | OUTPATIENT
Start: 2021-04-02 | End: 2021-04-02

## 2021-04-02 RX ORDER — CLOPIDOGREL BISULFATE 75 MG/1
1 TABLET, FILM COATED ORAL
Qty: 30 | Refills: 0
Start: 2021-04-02 | End: 2021-05-01

## 2021-04-02 RX ORDER — MAGNESIUM SULFATE 500 MG/ML
1 VIAL (ML) INJECTION ONCE
Refills: 0 | Status: COMPLETED | OUTPATIENT
Start: 2021-04-02 | End: 2021-04-02

## 2021-04-02 RX ORDER — JNJ-78436735 50000000000 [PFU]/.5ML
0.5 SUSPENSION INTRAMUSCULAR ONCE
Refills: 0 | Status: DISCONTINUED | OUTPATIENT
Start: 2021-04-02 | End: 2021-04-02

## 2021-04-02 RX ADMIN — INSULIN GLARGINE 12 UNIT(S): 100 INJECTION, SOLUTION SUBCUTANEOUS at 07:08

## 2021-04-02 RX ADMIN — Medication 50 MILLIGRAM(S): at 05:25

## 2021-04-02 RX ADMIN — Medication 7 UNIT(S): at 12:07

## 2021-04-02 RX ADMIN — HEPARIN SODIUM 5000 UNIT(S): 5000 INJECTION INTRAVENOUS; SUBCUTANEOUS at 05:25

## 2021-04-02 RX ADMIN — JNJ-78436735 0.5 MILLILITER(S): 50000000000 SUSPENSION INTRAMUSCULAR at 13:30

## 2021-04-02 RX ADMIN — Medication 100 GRAM(S): at 07:31

## 2021-04-02 RX ADMIN — Medication 3: at 12:07

## 2021-04-02 RX ADMIN — CLOPIDOGREL BISULFATE 75 MILLIGRAM(S): 75 TABLET, FILM COATED ORAL at 12:07

## 2021-04-02 RX ADMIN — Medication 7 UNIT(S): at 07:32

## 2021-04-02 RX ADMIN — LISINOPRIL 10 MILLIGRAM(S): 2.5 TABLET ORAL at 05:25

## 2021-04-02 RX ADMIN — Medication 81 MILLIGRAM(S): at 12:07

## 2021-04-02 RX ADMIN — CHLORHEXIDINE GLUCONATE 1 APPLICATION(S): 213 SOLUTION TOPICAL at 05:25

## 2021-04-02 RX ADMIN — Medication 4: at 06:45

## 2021-04-02 NOTE — PROGRESS NOTE ADULT - PROVIDER SPECIALTY LIST ADULT
Intervent Cardiology
MICU
Neurology
Neurology
Cardiology
Cardiology
Hospitalist
MICU
Cardiology
MICU
Rehab Medicine
Neurology

## 2021-04-02 NOTE — PROGRESS NOTE ADULT - NSICDXPILOT_GEN_ALL_CORE
[place]

## 2021-04-02 NOTE — PROGRESS NOTE ADULT - ASSESSMENT
77 yo F w/ a PMH of HTN, HLD, DM, TIA in 2011 (no residual deficits), PVD, Breast CA s/p chemo/radiation (on Anastrazole), CAD s/p cardiac cath and had 3 stents placed on 3/29.  Patient was found to have some slight left sided weakness and left sided neglect which matches decreased perfusion area along the right MCA territory.    #CVA- MR with Foci of diffusion restriction in the left cerebellar hemisphere and right posterior parietal lobe consistent with acute ischemia. TTE negative for LV/RV dysfunction and shunt   -Management per primary team   -BP goal per primary team, currently off phenyl gtt, -160s, on lisinopril at home, restart per cards/stroke team  -A1C 8.5%, lipid profile normal, TSH normal   -continue ASA 81mg & Plavix 75mg   -CW atorvastatin to 80mg daily- cholesterol and stroke prevention     #CAD: - s/p cardiac cath and had 3 stents placed  -On ASA 81 and plavix 75  -Metoprolol succinate 50mg daily per cardiology  -cardiology following, appreciate recs     #Hypertensive emergency: RESOLVED   Currently STEVEN 140s-160s  - Per cardio, can give IV hydral 10 as needed for SBP>180   - Goal SBP<180 per stroke team   - CW toprol 50QD  - On lisinopril 10mg QD at home, can increase if needed prior to giving IV BP medications     #Diabetes: A1C 8.5%  - on home metformin 500 BID, januvia 100 qd  - on Novolog 4 at lunch, tresiba 14 daily  - With uncontrolled BS, out of proportion from A1C, recommend increasing Lantus 14 units in AM, increase premeal to 9 units     #History of breast cancer  -Patient is on anastrazole 1q day   -Hold anastrazole at this time i/s/o ruling out stroke   -Please resume on discharge if cleared by neuro    #Anemia  No E/O bleeding on exam at this time, denies melena, hematochezia   However on ASA, plavix   -Rec monitoring BM   -Continue to trend   -ULN MCV, can consider eval or folate, B12, iron panel     #Thrombocytopenia   no e/o bleeding   Continue to monitor, [place] while on DAPT     #Asx bacteruria  Currently asx, no leukocytosis, no fever   No need to treat with abx at this time   Continue to monitor UOP

## 2021-04-02 NOTE — DISCHARGE NOTE NURSING/CASE MANAGEMENT/SOCIAL WORK - PATIENT PORTAL LINK FT
You can access the FollowMyHealth Patient Portal offered by [facility] by registering at the following website: [email or web address]. By joining [facility]’s FollowMyHealth portal, you will also be able to view your health information using other applications (apps) compatible with our system.

## 2021-04-03 LAB
CULTURE RESULTS: SIGNIFICANT CHANGE UP
CULTURE RESULTS: SIGNIFICANT CHANGE UP
SPECIMEN SOURCE: SIGNIFICANT CHANGE UP
SPECIMEN SOURCE: SIGNIFICANT CHANGE UP

## 2021-04-08 DIAGNOSIS — Z82.49 FAMILY HISTORY OF ISCHEMIC HEART DISEASE AND OTHER DISEASES OF THE CIRCULATORY SYSTEM: ICD-10-CM

## 2021-04-08 DIAGNOSIS — R47.01 APHASIA: ICD-10-CM

## 2021-04-08 DIAGNOSIS — Z23 ENCOUNTER FOR IMMUNIZATION: ICD-10-CM

## 2021-04-08 DIAGNOSIS — G81.94 HEMIPLEGIA, UNSPECIFIED AFFECTING LEFT NONDOMINANT SIDE: ICD-10-CM

## 2021-04-08 DIAGNOSIS — D64.9 ANEMIA, UNSPECIFIED: ICD-10-CM

## 2021-04-08 DIAGNOSIS — I25.110 ATHEROSCLEROTIC HEART DISEASE OF NATIVE CORONARY ARTERY WITH UNSTABLE ANGINA PECTORIS: ICD-10-CM

## 2021-04-08 DIAGNOSIS — I97.820 POSTPROCEDURAL CEREBROVASCULAR INFARCTION FOLLOWING CARDIAC SURGERY: ICD-10-CM
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
## 2021-04-08 DIAGNOSIS — Z92.3 PERSONAL HISTORY OF IRRADIATION: ICD-10-CM

## 2021-04-08 DIAGNOSIS — Z79.84 LONG TERM (CURRENT) USE OF ORAL HYPOGLYCEMIC DRUGS: ICD-10-CM

## 2021-04-08 DIAGNOSIS — Z86.73 PERSONAL HISTORY OF TRANSIENT ISCHEMIC ATTACK (TIA), AND CEREBRAL INFARCTION WITHOUT RESIDUAL DEFICITS: ICD-10-CM

## 2021-04-08 DIAGNOSIS — D47.3 ESSENTIAL (HEMORRHAGIC) THROMBOCYTHEMIA: ICD-10-CM

## 2021-04-08 DIAGNOSIS — R29.703 NIHSS SCORE 3: ICD-10-CM
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
## 2021-04-08 DIAGNOSIS — I16.1 HYPERTENSIVE EMERGENCY: ICD-10-CM

## 2021-04-08 DIAGNOSIS — Z88.8 ALLERGY STATUS TO OTHER DRUGS, MEDICAMENTS AND BIOLOGICAL SUBSTANCES STATUS: ICD-10-CM

## 2021-04-08 DIAGNOSIS — E78.5 HYPERLIPIDEMIA, UNSPECIFIED: ICD-10-CM

## 2021-04-08 DIAGNOSIS — Z85.3 PERSONAL HISTORY OF MALIGNANT NEOPLASM OF BREAST: ICD-10-CM

## 2021-04-08 DIAGNOSIS — Z92.21 PERSONAL HISTORY OF ANTINEOPLASTIC CHEMOTHERAPY: ICD-10-CM

## 2021-04-08 DIAGNOSIS — I25.84 CORONARY ATHEROSCLEROSIS DUE TO CALCIFIED CORONARY LESION: ICD-10-CM

## 2021-04-08 DIAGNOSIS — E11.9 TYPE 2 DIABETES MELLITUS WITHOUT COMPLICATIONS: ICD-10-CM

## 2021-04-08 DIAGNOSIS — Z79.4 LONG TERM (CURRENT) USE OF INSULIN: ICD-10-CM

## 2021-04-08 DIAGNOSIS — I63.511 CEREBRAL INFARCTION DUE TO UNSPECIFIED OCCLUSION OR STENOSIS OF RIGHT MIDDLE CEREBRAL ARTERY: ICD-10-CM

## 2021-04-08 DIAGNOSIS — I10 ESSENTIAL (PRIMARY) HYPERTENSION: ICD-10-CM

## 2021-11-13 NOTE — H&P ADULT - NSHPPOADEEPVENOUSTHROMB_GEN_A_CORE
[FreeTextEntry1] : Right\par 0.5-35\par 1-30\par 2-25\par 4-50\par \par \par Left\par 0.5-25\par 1-30\par 2-20\par 4- 45
no

## 2023-01-31 NOTE — H&P ADULT - BP NONINVASIVE SYSTOLIC (MM HG)
Joie Pete     LN    9:45 AM  Note   According to chart notes, patient does not meet the criteria for coverage on tirzepatide (Mounjaro) 7.5 MG/0.5ML Solution Pen-injector.     This medication is only FDA approved for Type 2 diabetes, not prediabetes or weight loss.     Please consider switching to an FDA approved weight loss medication such as:     Saxenda  Phentermine  Contrave  Qsymia  Xenical  Wegovy      Coverage for above medications is not guaranteed, based on patient’s insurance plan.      NOTE- plan will not cover Byetta, Trulicity, Victoza, Bydureon, Ozempic, Mounjaro, OR Rybelsus for prediabetes or weight loss/obesity. These medications are only approved for Type 2 Diabetes. PA's will not be able to be obtained.           181

## 2023-06-13 NOTE — OCCUPATIONAL THERAPY INITIAL EVALUATION ADULT - ADDITIONAL COMMENTS
CT Head (3/29): No acute intracranial hemorrhage, mass effect, or territorial infarction. Age-appropriate volume loss with chronic changes, as above.  CT Abdomen and Plevis (3/28): No retroperitoneal hematoma. No abdominopelvic fluid collection. Small dense pericardial effusion.  CT Brain (3/29): No intracranial mass effect or CT evidence of recent transcortical infarct. No acute parenchymal hemorrhage. No definite acute intracranial hemorrhage, given some limitation by the presence of residual intravascular contrast within the subarachnoid space. Sski Pregnancy And Lactation Text: This medication is Pregnancy Category D and isn't considered safe during pregnancy. It is excreted in breast milk.

## 2024-02-22 NOTE — CONSULT NOTE ADULT - CONSULT REQUESTED DATE/TIME
Call regarding appt. with PCP on 1/29/2024 after hospitalization.  At time of outreach call the patient feels as if their condition has improved since last visit.  Reviewed the PCP appointment with the pt and addressed any questions or concerns.  Kwadwo states things are going pretty well. He will have the hernia surgery next month.   
30-Mar-2021 05:40
29-Mar-2021 16:16
29-Mar-2021 14:00
01-Apr-2021 11:34

## 2024-03-19 NOTE — PROGRESS NOTE ADULT - PROBLEM/PLAN-5
1100: Pt arrived ambulatory and in no distress for Even[place].  Assessment unchanged. No new complaints voiced.She had come in earlier for her labs.    Medication given in 2 subcu injections in both arms    1130 Discharged home ambulatory and in no distress. Next appointment 4/16/24  
DISPLAY PLAN FREE TEXT
Yes

## 2024-04-05 PROBLEM — E11.9 TYPE 2 DIABETES MELLITUS WITHOUT COMPLICATIONS: Chronic | Status: ACTIVE | Noted: 2021-03-25

## 2024-04-05 PROBLEM — I10 ESSENTIAL (PRIMARY) HYPERTENSION: Chronic | Status: ACTIVE | Noted: 2021-03-25

## 2024-04-05 PROBLEM — C50.919 MALIGNANT NEOPLASM OF UNSPECIFIED SITE OF UNSPECIFIED FEMALE BREAST: Chronic | Status: ACTIVE | Noted: 2021-03-25

## 2024-04-05 PROBLEM — E78.5 HYPERLIPIDEMIA, UNSPECIFIED: Chronic | Status: ACTIVE | Noted: 2021-03-25

## 2024-04-08 DIAGNOSIS — I48.19 OTHER PERSISTENT ATRIAL FIBRILLATION: ICD-10-CM

## 2024-04-08 PROBLEM — Z00.00 ENCOUNTER FOR PREVENTIVE HEALTH EXAMINATION: Status: ACTIVE | Noted: 2024-04-08

## 2024-05-16 NOTE — PROGRESS NOTE ADULT - ASSESSMENT
75 yo F w/ a PMH of HTN, HLD, DM, TIA in 2011 (no residual deficits), PVD, Breast CA s/p chemo/radiation (on Anastrazole) s/p cardiac cath and had 3 stents placed on 3/29.  Stroke code was called about 15 minutes post cardiac cath due to confusion, difficulty following commands and getting words out. HCT negative. CTA showed no large vessel occlusion. Given that patient received heparin bolus and PTT >200, tPA was not administered. Initial NIHSS 3, however, when BP was controlled to 150/80, patient was found to have some slight left sided weakness and left sided neglect which matches decreased perfusion area along the right MCA territory. Therefore, it is possible that patient has right MCA stroke.     NEURO  #R/o Right MCA Stroke  Stroke code was called about 15 minutes post cardiac cath due to confusion, difficulty following commands and getting words out. HCT negative. CTA showed no large vessel occlusion. Given that patient received heparin bolus and PTT >200, tPA was not administered. Initial NIHSS 3, however, when BP was controlled to 150/80, patient was found to have some slight left sided weakness and left sided neglect which matches decreased perfusion area along the right MCA territory.  - Closely follow neuro checks every 1 hour  - Repeat HCT for acute changes in neuro exam  - Obtain MRI Brain without contrast  - Obtain TTE with bubble  - Goal -180  - Bedside Dysphagia Screen  - PT/OT/SLP   - Obtain Hemoglobin A1C, Lipid Profile Panel, and TSH  - continue ASA 81mg & Plavix 75mg - was loaded with ASA 325mg and Plavix 600mg this morning  - Increase atorvastatin to 80mg daily- cholesterol and stroke prevention   - Start heparin SQ and SCDs for DVT prophylaxis     RESPIRATORY  No active issues    CARDIOVASCULAR  #Active CAD  - s/p cardiac cath and had 3 stents placed  - On ASA 81 and plavix 75    #Hypertensive emergency  - bp in 240s/120s with wretching and vomiting shortly after arrival to MICU  - given labetalol with good response  - goal sbp 160-200     #HTN  - on home lisinopril 10 and toprol 100  - dc antihypertensives to maintain BP     #HLD  - on home rosuvastatin 10  - c/w atorvastatin 80 inpatient    GI  No active issues    RENAL  No active issues    ENDO  #DM2  - on home metformin 500 BID, januvia 100 qd  - on Novolog 4 at lunch, tresiba 14 daily    ID  No active issues    HEME-ONC  #h/o breast cancer  - on home anastrazole 1 qd    F: None   E: Replete for K<4 Mag<2  N: NPO  A: HSQ  Code status: full  Dispo: [facility]   visit on 05/16/24.  Imaging:  All Radiology results interpreted by Radiologist unless otherwise noted.  No orders to display       ED Course / Medical Decision Making   Medications - No data to display       Consult(s):   IP CONSULT TO SOCIAL WORK    Procedure(s):   none    MDM:   Briefly this is a 43-year-old male patient who is presenting with complaints of homelessness.  Initially presented stating that he would like to speak with .  On my evaluation patient declines any social work involvement.  States that he only wants a place to sleep for a little while and something to eat.  He denies any suicidal or homicidal ideation, denies any hallucinations.  Denies any current drug or alcohol use.  On chart review patient was seen in the last 2 months on 4/27/2024, 4/29/2024 twice 4/30/2024, 5/1/2024, 5/2/2024, 5/5/2024 twice, 5/11/2024 twice.  Patient is offering no medical complaints.  He initially came in asking to speak to social work but does not want social work.  He appears to have decision-making capacity and is stable.  Patient will be discharged to follow-up as an outpatient    Patient was explicitly instructed on specific signs and symptoms on which to return to the emergency room for. Patient was instructed to return to the ER for any new or worsening symptoms. Additional discharge instructions were given verbally. All questions were answered. Patient is comfortable and agreeable with discharge plan. Patient in no acute distress and non-toxic in appearance.      Plan of Care/Counseling:  JUICE Blackwell CNP reviewed today's visit with the patient in addition to providing specific details for the plan of care and counseling regarding the diagnosis and prognosis.  Questions are answered at this time and are agreeable with the plan.    Assessment      1. Homelessness      Plan   Discharged home.  Patient condition is stable    [facility] Emergency  75 yo F w/ a PMH of HTN, HLD, DM, TIA in 2011 (no residual deficits), PVD, Breast CA s/p chemo/radiation (on Anastrazole), CAD s/p cardiac cath and had 3 stents placed on 3/29.  Stroke code was called about 15 minutes post cardiac cath due to confusion, difficulty following commands and getting words out. HCT negative, TTE negative for shunt. CTA showed no large vessel occlusion. Given that patient received heparin bolus and PTT >200, tPA was not administered. Initial NIHSS 3, however, when BP was controlled to 150/80, patient was found to have some slight left sided weakness and left sided neglect which matches decreased perfusion area along the right MCA territory. Neuro deficits have since resolved and patient is medically stable [facility].     NEURO  #R/o Right MCA Stroke  Stroke code was called about 15 minutes post cardiac cath due to confusion, difficulty following commands and getting words out. HCT negative. CTA showed no large vessel occlusion. Given that patient received heparin bolus and PTT >200, tPA was not administered. Initial NIHSS 3, however, when BP was controlled to 150/80, patient was found to have some slight left sided weakness and left sided neglect which matches decreased perfusion area along the right MCA territory.  -TTE negative for LV/RV dysfunction and shunt   -Briefly on phenylephrine gtt to keep BPs higher, however now d/c   Plan:   - Obtain MRI Brain without contrast  - Goal SBP < 180, allow permissive hypertension. Per cardio, can give IV hydral 10mg PRN for SBP>180  - PT preliminary recs = home. OT recommends home with no needs   - A1C 8.5%, lipid profile normal, TSH normal   - continue ASA 81mg & Plavix 75mg   - Increased atorvastatin to 80mg daily- cholesterol and stroke prevention   - Start heparin SQ and SCDs for DVT prophylaxis     RESPIRATORY  No active issues    CARDIOVASCULAR  #Active CAD  - s/p cardiac cath and had 3 stents placed  - On ASA 81 and plavix 75  - Metoprolol tartrate 25mg BID per cardiology     #Hypertensive emergency - RESOLVED   - bp in 240s/120s with retching and vomiting shortly after arrival to MICU  - given labetalol with good response  - Per cardio, can give IV hydral 10 as needed for SBP>180   - Goal SBP<180     #HTN  - on home lisinopril 10 and Toprol 100  - hold for now in the setting of allowing permissive hypertension, restart when patient is able to tolerate     #HLD  - on home rosuvastatin 10  - c/w atorvastatin 80 inpatient    GI  No active issues    RENAL  No active issues    ENDO  #DM2, A1C 8.5%  - on home metformin 500 BID, januvia 100 qd  - on Novolog 4 at lunch, tresiba 14 daily  - Lantus 9, lispro 3 TID + MISS inpatient     ID  No active issues    #Positive Nitrite on UA  -Patient given one dose of ceftriaxone, however d/c as UA not positive for UTI. Patient has no dysuria on exam either.     HEME-ONC  #Hx of breast cancer   -Patient is on anastrazole 1q day   -Hold anastrazole at this time i/s/o ruling out stroke   -Please resume on discharge if cleared by neuro     F: None   E: Replete for K<4 Mag<2  N: DASH/TLC  A: HSQ every 8 hours   Code status: full  Dispo:  [facility]

## 2024-05-31 ENCOUNTER — OUTPATIENT (OUTPATIENT)
Dept: OUTPATIENT SERVICES | Facility: HOSPITAL | Age: 79
LOS: 1 days | End: 2024-05-31
Payer: COMMERCIAL

## 2024-05-31 ENCOUNTER — APPOINTMENT (OUTPATIENT)
Dept: CT IMAGING | Facility: HOSPITAL | Age: 79
End: 2024-05-31

## 2024-05-31 ENCOUNTER — RESULT REVIEW (OUTPATIENT)
Age: 79
End: 2024-05-31

## 2024-05-31 DIAGNOSIS — Z98.891 HISTORY OF UTERINE SCAR FROM PREVIOUS SURGERY: Chronic | ICD-10-CM

## 2024-05-31 DIAGNOSIS — Z98.890 OTHER SPECIFIED POSTPROCEDURAL STATES: Chronic | ICD-10-CM

## 2024-05-31 DIAGNOSIS — I48.19 OTHER PERSISTENT ATRIAL FIBRILLATION: ICD-10-CM

## 2024-05-31 DIAGNOSIS — Z90.49 ACQUIRED ABSENCE OF OTHER SPECIFIED PARTS OF DIGESTIVE TRACT: Chronic | ICD-10-CM

## 2024-05-31 PROCEDURE — 82565 ASSAY OF CREATININE: CPT

## 2024-05-31 PROCEDURE — 75572 CT HRT W/3D IMAGE: CPT | Mod: 26

## 2024-05-31 PROCEDURE — 75572 CT HRT W/3D IMAGE: CPT

## 2024-06-03 ENCOUNTER — NON-APPOINTMENT (OUTPATIENT)
Age: 79
End: 2024-06-03

## 2024-06-03 ENCOUNTER — APPOINTMENT (OUTPATIENT)
Dept: HEART AND VASCULAR | Facility: CLINIC | Age: 79
End: 2024-06-03
Payer: COMMERCIAL

## 2024-06-03 VITALS
RESPIRATION RATE: 16 BRPM | DIASTOLIC BLOOD PRESSURE: 72 MMHG | HEART RATE: 71 BPM | WEIGHT: 119.93 LBS | HEIGHT: 64 IN | SYSTOLIC BLOOD PRESSURE: 161 MMHG | TEMPERATURE: 97 F | OXYGEN SATURATION: 97 %

## 2024-06-03 VITALS
SYSTOLIC BLOOD PRESSURE: 177 MMHG | BODY MASS INDEX: 20.49 KG/M2 | HEIGHT: 64 IN | TEMPERATURE: 95 F | DIASTOLIC BLOOD PRESSURE: 73 MMHG | HEART RATE: 66 BPM | WEIGHT: 120 LBS

## 2024-06-03 DIAGNOSIS — C50.919 MALIGNANT NEOPLASM OF UNSPECIFIED SITE OF UNSPECIFIED FEMALE BREAST: ICD-10-CM

## 2024-06-03 DIAGNOSIS — I25.10 ATHEROSCLEROTIC HEART DISEASE OF NATIVE CORONARY ARTERY W/OUT ANGINA PECTORIS: ICD-10-CM

## 2024-06-03 DIAGNOSIS — I01.1 ACUTE RHEUMATIC ENDOCARDITIS: ICD-10-CM

## 2024-06-03 DIAGNOSIS — I10 ESSENTIAL (PRIMARY) HYPERTENSION: ICD-10-CM

## 2024-06-03 PROCEDURE — 99204 OFFICE O/P NEW MOD 45 MIN: CPT

## 2024-06-03 PROCEDURE — 93000 ELECTROCARDIOGRAM COMPLETE: CPT

## 2024-06-03 RX ORDER — ATORVASTATIN CALCIUM 80 MG/1
80 TABLET, FILM COATED ORAL
Refills: 0 | Status: ACTIVE | COMMUNITY

## 2024-06-03 RX ORDER — INSULIN ASPART 100 [IU]/ML
4 INJECTION, SOLUTION SUBCUTANEOUS
Qty: 0 | Refills: 0 | DISCHARGE

## 2024-06-03 RX ORDER — ANASTROZOLE 1 MG/1
1 TABLET ORAL
Qty: 0 | Refills: 0 | DISCHARGE

## 2024-06-03 RX ORDER — INSULIN ASPART 100 [IU]/ML
100 INJECTION, SOLUTION INTRAVENOUS; SUBCUTANEOUS
Refills: 0 | Status: ACTIVE | COMMUNITY

## 2024-06-03 RX ORDER — ANASTROZOLE TABLETS 1 MG/1
1 TABLET ORAL
Refills: 0 | Status: ACTIVE | COMMUNITY

## 2024-06-03 RX ORDER — LINAGLIPTIN 5 MG/1
5 TABLET, FILM COATED ORAL
Refills: 0 | Status: ACTIVE | COMMUNITY

## 2024-06-03 RX ORDER — METOPROLOL SUCCINATE 100 MG/1
100 TABLET, EXTENDED RELEASE ORAL
Refills: 0 | Status: ACTIVE | COMMUNITY

## 2024-06-03 RX ORDER — INSULIN DEGLUDEC INJECTION 200 U/ML
200 INJECTION, SOLUTION SUBCUTANEOUS
Refills: 0 | Status: ACTIVE | COMMUNITY

## 2024-06-03 RX ORDER — FOLIC ACID 1 MG/1
1 TABLET ORAL
Refills: 0 | Status: ACTIVE | COMMUNITY

## 2024-06-03 RX ORDER — METHOTREXATE 2.5 MG/1
2.5 TABLET ORAL WEEKLY
Refills: 0 | Status: ACTIVE | COMMUNITY

## 2024-06-03 RX ORDER — FUROSEMIDE 20 MG/1
20 TABLET ORAL
Refills: 0 | Status: ACTIVE | COMMUNITY

## 2024-06-03 RX ORDER — ALENDRONATE SODIUM 70 MG/1
70 TABLET ORAL
Refills: 0 | Status: ACTIVE | COMMUNITY

## 2024-06-03 RX ORDER — NIFEDIPINE 60 MG
60 TABLET, EXTENDED RELEASE ORAL
Refills: 0 | Status: ACTIVE | COMMUNITY

## 2024-06-03 RX ORDER — OMEPRAZOLE 20 MG/1
20 TABLET, DELAYED RELEASE ORAL
Refills: 0 | Status: ACTIVE | COMMUNITY

## 2024-06-03 RX ORDER — APIXABAN 5 MG/1
5 TABLET, FILM COATED ORAL
Refills: 0 | Status: ACTIVE | COMMUNITY

## 2024-06-03 NOTE — DISCUSSION/SUMMARY
[FreeTextEntry1] : Ms. Paul is a pleasant 79 year-old female with a past medical history significant for HTN, HLD, CAD s/p stents in 3/2021, Left Breast CA (2015, s/p lumpectomy, CTX / XRT), Right Breast CA (2023, s/p Lumpectomy, CTX / XRT), atrial fibrillation and recurrent falls who presents for evaluation for ROBBY closure.    We discussed alternatives to long-term anticoagulation including ROBBY closure with the  Watchman device.  Explained that the Watchman implant is a minimally invasive procedure designed to reduce the risk of strokes that originate in the left atrial appendage (ROBBY).  Discussed the indication to remain on an OAC for at least 45 days post-procedure or dual anti-platelet therapy (DAPT).  Risks of the procedure, including but not limited to vascular injury, cardiac perforation, device embolism, device thrombosis, infection, TE/CVA and death were among those discussed.  The patient verbalized understanding and would like to proceed.  Pre procedure instructions were discussed.

## 2024-06-03 NOTE — PATIENT PROFILE ADULT - FALL HARM RISK - HARM RISK INTERVENTIONS

## 2024-06-03 NOTE — HISTORY OF PRESENT ILLNESS
[FreeTextEntry1] : Ms. Paul is a pleasant 79 year-old female with a past medical history significant for HTN, HLD, CAD s/p stents in 3/2021, Left Breast CA (2015, s/p lumpectomy, CTX / XRT), Right Breast CA (2023, s/p Lumpectomy, CTX / XRT), atrial fibrillation and recurrent falls who presents for evaluation for ROBBY closure.    In September 2023 she had a fall with head trauma.  Eliquis was discontinued.  She has had three significant falls within the last three months- one resulting in an elbow fracture.  She has been on low dose Eliquis 2.5 m BID since January 2024.    Accompanied by her daughter, Stalin.

## 2024-06-03 NOTE — CARDIOLOGY SUMMARY
[de-identified] : 6/3/24 NSR, PVC [de-identified] : TTE 10/18/23 EF 69%, la index volume mildly increased

## 2024-06-03 NOTE — ADDENDUM
[FreeTextEntry1] : I, Aida Hill, am scribing for and the presence of Dr. Ortiz the following sections: HPI, PMH,Family/social history, ROS, Physical Exam, Assessment / Plan.   I, Elke Ortiz, personally performed the services described in the documentation, reviewed the documentation recorded by the scribe in my presence and it accurately and completely records my words and actions.

## 2024-06-04 ENCOUNTER — RESULT REVIEW (OUTPATIENT)
Age: 79
End: 2024-06-04

## 2024-06-04 ENCOUNTER — INPATIENT (INPATIENT)
Facility: HOSPITAL | Age: 79
LOS: 0 days | Discharge: HOME | End: 2024-06-05
Attending: INTERNAL MEDICINE | Admitting: INTERNAL MEDICINE
Payer: COMMERCIAL

## 2024-06-04 ENCOUNTER — APPOINTMENT (OUTPATIENT)
Dept: CARDIOTHORACIC SURGERY | Facility: HOSPITAL | Age: 79
End: 2024-06-04

## 2024-06-04 DIAGNOSIS — Z90.49 ACQUIRED ABSENCE OF OTHER SPECIFIED PARTS OF DIGESTIVE TRACT: Chronic | ICD-10-CM

## 2024-06-04 DIAGNOSIS — Z98.890 OTHER SPECIFIED POSTPROCEDURAL STATES: Chronic | ICD-10-CM

## 2024-06-04 DIAGNOSIS — Z98.891 HISTORY OF UTERINE SCAR FROM PREVIOUS SURGERY: Chronic | ICD-10-CM

## 2024-06-04 LAB
ALBUMIN SERPL ELPH-MCNC: 3.3 G/DL — SIGNIFICANT CHANGE UP (ref 3.3–5)
ALBUMIN SERPL ELPH-MCNC: 3.5 G/DL — SIGNIFICANT CHANGE UP (ref 3.3–5)
ALP SERPL-CCNC: 152 U/L — HIGH (ref 40–120)
ALP SERPL-CCNC: 171 U/L — HIGH (ref 40–120)
ALT FLD-CCNC: 12 U/L — SIGNIFICANT CHANGE UP (ref 10–45)
ALT FLD-CCNC: 14 U/L — SIGNIFICANT CHANGE UP (ref 10–45)
ANION GAP SERPL CALC-SCNC: 10 MMOL/L — SIGNIFICANT CHANGE UP (ref 5–17)
ANION GAP SERPL CALC-SCNC: 9 MMOL/L — SIGNIFICANT CHANGE UP (ref 5–17)
APTT BLD: 35 SEC — SIGNIFICANT CHANGE UP (ref 24.5–35.6)
APTT BLD: 35.2 SEC — SIGNIFICANT CHANGE UP (ref 24.5–35.6)
AST SERPL-CCNC: 19 U/L — SIGNIFICANT CHANGE UP (ref 10–40)
AST SERPL-CCNC: 22 U/L — SIGNIFICANT CHANGE UP (ref 10–40)
BASOPHILS # BLD AUTO: 0.02 K/UL — SIGNIFICANT CHANGE UP (ref 0–0.2)
BASOPHILS NFR BLD AUTO: 0.4 % — SIGNIFICANT CHANGE UP (ref 0–2)
BILIRUB SERPL-MCNC: 0.3 MG/DL — SIGNIFICANT CHANGE UP (ref 0.2–1.2)
BILIRUB SERPL-MCNC: 0.4 MG/DL — SIGNIFICANT CHANGE UP (ref 0.2–1.2)
BLD GP AB SCN SERPL QL: NEGATIVE — SIGNIFICANT CHANGE UP
BUN SERPL-MCNC: 37 MG/DL — HIGH (ref 7–23)
BUN SERPL-MCNC: 40 MG/DL — HIGH (ref 7–23)
CALCIUM SERPL-MCNC: 9.7 MG/DL — SIGNIFICANT CHANGE UP (ref 8.4–10.5)
CALCIUM SERPL-MCNC: 9.8 MG/DL — SIGNIFICANT CHANGE UP (ref 8.4–10.5)
CHLORIDE SERPL-SCNC: 103 MMOL/L — SIGNIFICANT CHANGE UP (ref 96–108)
CHLORIDE SERPL-SCNC: 105 MMOL/L — SIGNIFICANT CHANGE UP (ref 96–108)
CK SERPL-CCNC: 87 U/L — SIGNIFICANT CHANGE UP (ref 25–170)
CO2 SERPL-SCNC: 25 MMOL/L — SIGNIFICANT CHANGE UP (ref 22–31)
CO2 SERPL-SCNC: 28 MMOL/L — SIGNIFICANT CHANGE UP (ref 22–31)
CREAT SERPL-MCNC: 1.75 MG/DL — HIGH (ref 0.5–1.3)
CREAT SERPL-MCNC: 1.88 MG/DL — HIGH (ref 0.5–1.3)
EGFR: 27 ML/MIN/1.73M2 — LOW
EGFR: 29 ML/MIN/1.73M2 — LOW
EOSINOPHIL # BLD AUTO: 0.16 K/UL — SIGNIFICANT CHANGE UP (ref 0–0.5)
EOSINOPHIL NFR BLD AUTO: 3.3 % — SIGNIFICANT CHANGE UP (ref 0–6)
GAS PNL BLDA: SIGNIFICANT CHANGE UP
GLUCOSE BLDC GLUCOMTR-MCNC: 255 MG/DL — HIGH (ref 70–99)
GLUCOSE BLDC GLUCOMTR-MCNC: 278 MG/DL — HIGH (ref 70–99)
GLUCOSE SERPL-MCNC: 247 MG/DL — HIGH (ref 70–99)
GLUCOSE SERPL-MCNC: 273 MG/DL — HIGH (ref 70–99)
HCT VFR BLD CALC: 25.7 % — LOW (ref 34.5–45)
HCT VFR BLD CALC: 32 % — LOW (ref 34.5–45)
HGB BLD-MCNC: 8.1 G/DL — LOW (ref 11.5–15.5)
HGB BLD-MCNC: 9.8 G/DL — LOW (ref 11.5–15.5)
IMM GRANULOCYTES NFR BLD AUTO: 1 % — HIGH (ref 0–0.9)
INR BLD: 1.38 — HIGH (ref 0.85–1.18)
INR BLD: 1.48 — HIGH (ref 0.85–1.18)
LYMPHOCYTES # BLD AUTO: 1.22 K/UL — SIGNIFICANT CHANGE UP (ref 1–3.3)
LYMPHOCYTES # BLD AUTO: 25.3 % — SIGNIFICANT CHANGE UP (ref 13–44)
MAGNESIUM SERPL-MCNC: 1.7 MG/DL — SIGNIFICANT CHANGE UP (ref 1.6–2.6)
MAGNESIUM SERPL-MCNC: 2 MG/DL — SIGNIFICANT CHANGE UP (ref 1.6–2.6)
MCHC RBC-ENTMCNC: 28.7 PG — SIGNIFICANT CHANGE UP (ref 27–34)
MCHC RBC-ENTMCNC: 29.3 PG — SIGNIFICANT CHANGE UP (ref 27–34)
MCHC RBC-ENTMCNC: 30.6 GM/DL — LOW (ref 32–36)
MCHC RBC-ENTMCNC: 31.5 GM/DL — LOW (ref 32–36)
MCV RBC AUTO: 93.1 FL — SIGNIFICANT CHANGE UP (ref 80–100)
MCV RBC AUTO: 93.8 FL — SIGNIFICANT CHANGE UP (ref 80–100)
MONOCYTES # BLD AUTO: 0.34 K/UL — SIGNIFICANT CHANGE UP (ref 0–0.9)
MONOCYTES NFR BLD AUTO: 7 % — SIGNIFICANT CHANGE UP (ref 2–14)
NEUTROPHILS # BLD AUTO: 3.04 K/UL — SIGNIFICANT CHANGE UP (ref 1.8–7.4)
NEUTROPHILS NFR BLD AUTO: 63 % — SIGNIFICANT CHANGE UP (ref 43–77)
NRBC # BLD: 0 /100 WBCS — SIGNIFICANT CHANGE UP (ref 0–0)
NRBC # BLD: 0 /100 WBCS — SIGNIFICANT CHANGE UP (ref 0–0)
NT-PROBNP SERPL-SCNC: 1058 PG/ML — HIGH (ref 0–300)
PHOSPHATE SERPL-MCNC: 3.6 MG/DL — SIGNIFICANT CHANGE UP (ref 2.5–4.5)
PHOSPHATE SERPL-MCNC: 4.2 MG/DL — SIGNIFICANT CHANGE UP (ref 2.5–4.5)
PLATELET # BLD AUTO: 108 K/UL — LOW (ref 150–400)
PLATELET # BLD AUTO: 116 K/UL — LOW (ref 150–400)
POTASSIUM SERPL-MCNC: 3.5 MMOL/L — SIGNIFICANT CHANGE UP (ref 3.5–5.3)
POTASSIUM SERPL-MCNC: 4.9 MMOL/L — SIGNIFICANT CHANGE UP (ref 3.5–5.3)
POTASSIUM SERPL-SCNC: 3.5 MMOL/L — SIGNIFICANT CHANGE UP (ref 3.5–5.3)
POTASSIUM SERPL-SCNC: 4.9 MMOL/L — SIGNIFICANT CHANGE UP (ref 3.5–5.3)
PROT SERPL-MCNC: 6.7 G/DL — SIGNIFICANT CHANGE UP (ref 6–8.3)
PROT SERPL-MCNC: 8.2 G/DL — SIGNIFICANT CHANGE UP (ref 6–8.3)
PROTHROM AB SERPL-ACNC: 15.6 SEC — HIGH (ref 9.5–13)
PROTHROM AB SERPL-ACNC: 16.7 SEC — HIGH (ref 9.5–13)
RBC # BLD: 2.76 M/UL — LOW (ref 3.8–5.2)
RBC # BLD: 3.41 M/UL — LOW (ref 3.8–5.2)
RBC # FLD: 14.2 % — SIGNIFICANT CHANGE UP (ref 10.3–14.5)
RBC # FLD: 14.5 % — SIGNIFICANT CHANGE UP (ref 10.3–14.5)
RH IG SCN BLD-IMP: POSITIVE — SIGNIFICANT CHANGE UP
RH IG SCN BLD-IMP: POSITIVE — SIGNIFICANT CHANGE UP
SODIUM SERPL-SCNC: 140 MMOL/L — SIGNIFICANT CHANGE UP (ref 135–145)
SODIUM SERPL-SCNC: 140 MMOL/L — SIGNIFICANT CHANGE UP (ref 135–145)
WBC # BLD: 4.69 K/UL — SIGNIFICANT CHANGE UP (ref 3.8–10.5)
WBC # BLD: 4.83 K/UL — SIGNIFICANT CHANGE UP (ref 3.8–10.5)
WBC # FLD AUTO: 4.69 K/UL — SIGNIFICANT CHANGE UP (ref 3.8–10.5)
WBC # FLD AUTO: 4.83 K/UL — SIGNIFICANT CHANGE UP (ref 3.8–10.5)

## 2024-06-04 PROCEDURE — 93355 ECHO TRANSESOPHAGEAL (TEE): CPT | Mod: 26

## 2024-06-04 PROCEDURE — 93010 ELECTROCARDIOGRAM REPORT: CPT

## 2024-06-04 PROCEDURE — 33340 PERQ CLSR TCAT L ATR APNDGE: CPT | Mod: Q0

## 2024-06-04 PROCEDURE — 71045 X-RAY EXAM CHEST 1 VIEW: CPT | Mod: 26

## 2024-06-04 DEVICE — GUIDEWIRE TERUMO GLIDEWIRE STIFF STRAGHT .035" X 180CM: Type: IMPLANTABLE DEVICE | Status: FUNCTIONAL

## 2024-06-04 DEVICE — INTRO MICROPUNC 4FRX10CM SS: Type: IMPLANTABLE DEVICE | Status: FUNCTIONAL

## 2024-06-04 DEVICE — KIT VERSACROSS CONNECT LAAC ACCESS 230-P RF WIRE 85CM: Type: IMPLANTABLE DEVICE | Status: FUNCTIONAL

## 2024-06-04 DEVICE — SYS VASCADE MVP VASCULAR CLOSURE 6-12F: Type: IMPLANTABLE DEVICE | Status: FUNCTIONAL

## 2024-06-04 DEVICE — GWIRE GUID  0.035INX150CM: Type: IMPLANTABLE DEVICE | Status: FUNCTIONAL

## 2024-06-04 DEVICE — SHEATH INTRODUCER TERUMO PINNACLE CORONARY 11FR X 10CM X 0.038" MINI WIRE: Type: IMPLANTABLE DEVICE | Status: FUNCTIONAL

## 2024-06-04 DEVICE — CATH DX PIG 145 INFIN 5FRX110CM: Type: IMPLANTABLE DEVICE | Status: FUNCTIONAL

## 2024-06-04 DEVICE — SYS ACCESS FXD WATCHMAN DBL: Type: IMPLANTABLE DEVICE | Status: FUNCTIONAL

## 2024-06-04 DEVICE — IMPLANTABLE DEVICE: Type: IMPLANTABLE DEVICE | Status: FUNCTIONAL

## 2024-06-04 DEVICE — SHEATH INTRODUCER TERUMO PINNACLE CORONARY 8FR X 10CM X 0.038" MINI WIRE: Type: IMPLANTABLE DEVICE | Status: FUNCTIONAL

## 2024-06-04 RX ORDER — LINAGLIPTIN 5 MG/1
1 TABLET, FILM COATED ORAL
Refills: 0 | DISCHARGE

## 2024-06-04 RX ORDER — SODIUM CHLORIDE 9 MG/ML
1000 INJECTION INTRAMUSCULAR; INTRAVENOUS; SUBCUTANEOUS
Refills: 0 | Status: DISCONTINUED | OUTPATIENT
Start: 2024-06-04 | End: 2024-06-05

## 2024-06-04 RX ORDER — ALBUMIN HUMAN 25 %
250 VIAL (ML) INTRAVENOUS ONCE
Refills: 0 | Status: COMPLETED | OUTPATIENT
Start: 2024-06-04 | End: 2024-06-04

## 2024-06-04 RX ORDER — INSULIN LISPRO 100/ML
VIAL (ML) SUBCUTANEOUS
Refills: 0 | Status: DISCONTINUED | OUTPATIENT
Start: 2024-06-04 | End: 2024-06-05

## 2024-06-04 RX ORDER — DEXTROSE 10 % IN WATER 10 %
125 INTRAVENOUS SOLUTION INTRAVENOUS ONCE
Refills: 0 | Status: DISCONTINUED | OUTPATIENT
Start: 2024-06-04 | End: 2024-06-05

## 2024-06-04 RX ORDER — SENNA PLUS 8.6 MG/1
2 TABLET ORAL AT BEDTIME
Refills: 0 | Status: DISCONTINUED | OUTPATIENT
Start: 2024-06-04 | End: 2024-06-05

## 2024-06-04 RX ORDER — SODIUM CHLORIDE 9 MG/ML
1000 INJECTION, SOLUTION INTRAVENOUS
Refills: 0 | Status: DISCONTINUED | OUTPATIENT
Start: 2024-06-04 | End: 2024-06-05

## 2024-06-04 RX ORDER — PANTOPRAZOLE SODIUM 20 MG/1
40 TABLET, DELAYED RELEASE ORAL ONCE
Refills: 0 | Status: DISCONTINUED | OUTPATIENT
Start: 2024-06-04 | End: 2024-06-05

## 2024-06-04 RX ORDER — GLUCAGON INJECTION, SOLUTION 0.5 MG/.1ML
1 INJECTION, SOLUTION SUBCUTANEOUS ONCE
Refills: 0 | Status: DISCONTINUED | OUTPATIENT
Start: 2024-06-04 | End: 2024-06-05

## 2024-06-04 RX ORDER — INSULIN DEGLUDEC 100 U/ML
6 INJECTION, SOLUTION SUBCUTANEOUS
Refills: 0 | DISCHARGE

## 2024-06-04 RX ORDER — POLYETHYLENE GLYCOL 3350 17 G/17G
17 POWDER, FOR SOLUTION ORAL DAILY
Refills: 0 | Status: DISCONTINUED | OUTPATIENT
Start: 2024-06-04 | End: 2024-06-05

## 2024-06-04 RX ORDER — METOPROLOL TARTRATE 50 MG
1 TABLET ORAL
Qty: 0 | Refills: 0 | DISCHARGE

## 2024-06-04 RX ORDER — ATORVASTATIN CALCIUM 80 MG/1
80 TABLET, FILM COATED ORAL AT BEDTIME
Refills: 0 | Status: DISCONTINUED | OUTPATIENT
Start: 2024-06-04 | End: 2024-06-05

## 2024-06-04 RX ORDER — APIXABAN 2.5 MG/1
2.5 TABLET, FILM COATED ORAL EVERY 12 HOURS
Refills: 0 | Status: DISCONTINUED | OUTPATIENT
Start: 2024-06-04 | End: 2024-06-05

## 2024-06-04 RX ORDER — CHLORHEXIDINE GLUCONATE 213 G/1000ML
1 SOLUTION TOPICAL DAILY
Refills: 0 | Status: DISCONTINUED | OUTPATIENT
Start: 2024-06-04 | End: 2024-06-05

## 2024-06-04 RX ORDER — ACETAMINOPHEN 500 MG
650 TABLET ORAL EVERY 6 HOURS
Refills: 0 | Status: DISCONTINUED | OUTPATIENT
Start: 2024-06-04 | End: 2024-06-05

## 2024-06-04 RX ORDER — DEXTROSE 50 % IN WATER 50 %
25 SYRINGE (ML) INTRAVENOUS ONCE
Refills: 0 | Status: DISCONTINUED | OUTPATIENT
Start: 2024-06-04 | End: 2024-06-05

## 2024-06-04 RX ORDER — POTASSIUM CHLORIDE 20 MEQ
40 PACKET (EA) ORAL ONCE
Refills: 0 | Status: COMPLETED | OUTPATIENT
Start: 2024-06-04 | End: 2024-06-04

## 2024-06-04 RX ORDER — DEXTROSE 50 % IN WATER 50 %
15 SYRINGE (ML) INTRAVENOUS ONCE
Refills: 0 | Status: DISCONTINUED | OUTPATIENT
Start: 2024-06-04 | End: 2024-06-05

## 2024-06-04 RX ORDER — LISINOPRIL 2.5 MG/1
1 TABLET ORAL
Qty: 0 | Refills: 0 | DISCHARGE

## 2024-06-04 RX ORDER — DEXTROSE 50 % IN WATER 50 %
12.5 SYRINGE (ML) INTRAVENOUS ONCE
Refills: 0 | Status: DISCONTINUED | OUTPATIENT
Start: 2024-06-04 | End: 2024-06-05

## 2024-06-04 RX ORDER — METFORMIN HYDROCHLORIDE 850 MG/1
1 TABLET ORAL
Qty: 0 | Refills: 0 | DISCHARGE

## 2024-06-04 RX ORDER — LOSARTAN POTASSIUM 100 MG/1
1 TABLET, FILM COATED ORAL
Refills: 0 | DISCHARGE

## 2024-06-04 RX ORDER — SITAGLIPTIN 50 MG/1
1 TABLET, FILM COATED ORAL
Qty: 0 | Refills: 0 | DISCHARGE

## 2024-06-04 RX ORDER — OMEPRAZOLE 10 MG/1
1 CAPSULE, DELAYED RELEASE ORAL
Refills: 0 | DISCHARGE

## 2024-06-04 RX ORDER — ANASTROZOLE 1 MG/1
1 TABLET ORAL DAILY
Refills: 0 | Status: DISCONTINUED | OUTPATIENT
Start: 2024-06-04 | End: 2024-06-05

## 2024-06-04 RX ORDER — CHLORHEXIDINE GLUCONATE 213 G/1000ML
15 SOLUTION TOPICAL EVERY 12 HOURS
Refills: 0 | Status: DISCONTINUED | OUTPATIENT
Start: 2024-06-04 | End: 2024-06-04

## 2024-06-04 RX ORDER — POTASSIUM CHLORIDE 20 MEQ
20 PACKET (EA) ORAL ONCE
Refills: 0 | Status: COMPLETED | OUTPATIENT
Start: 2024-06-04 | End: 2024-06-04

## 2024-06-04 RX ORDER — CEFAZOLIN SODIUM 1 G
2000 VIAL (EA) INJECTION EVERY 8 HOURS
Refills: 0 | Status: DISCONTINUED | OUTPATIENT
Start: 2024-06-04 | End: 2024-06-05

## 2024-06-04 RX ORDER — METOPROLOL TARTRATE 50 MG
1 TABLET ORAL
Refills: 0 | DISCHARGE

## 2024-06-04 RX ADMIN — Medication 6: at 20:08

## 2024-06-04 RX ADMIN — Medication 40 MILLIEQUIVALENT(S): at 21:16

## 2024-06-04 RX ADMIN — Medication 20 MILLIEQUIVALENT(S): at 21:34

## 2024-06-04 RX ADMIN — ATORVASTATIN CALCIUM 80 MILLIGRAM(S): 80 TABLET, FILM COATED ORAL at 21:16

## 2024-06-04 RX ADMIN — Medication 100 MILLIGRAM(S): at 21:16

## 2024-06-04 RX ADMIN — Medication 250 MILLILITER(S): at 17:55

## 2024-06-04 NOTE — H&P ADULT - NSHPPHYSICALEXAM_GEN_ALL_CORE
General: Sitting in bed comfortably in NAD  Neuro: A&Ox3, no focal deficits   HEENT: NCAT, EOMI   Cardiac: irregular rhythm, Regular rate , normal S1 and S2. No m/r/g   Pulm: Breathing comfortably on RA. No signs of respiratory distress. Lungs are CTA b/l without wheezes, rales, or rhonchi   Abdomen: Soft, non-distended, non-tender. + bowel sounds   : No lópez  Extremities: Warm and well perfused, no peripheral edema, distal pulses 2+. No calf tenderness. SCDs and TEDs in place  MSK: Full AROM

## 2024-06-04 NOTE — H&P ADULT - NSICDXPASTMEDICALHX_GEN_ALL_CORE_FT
PAST MEDICAL HISTORY:  Breast cancer     DM (diabetes mellitus)     History of atrial fibrillation     HLD (hyperlipidemia)     HTN (hypertension)

## 2024-06-04 NOTE — BRIEF OPERATIVE NOTE - OPERATION/FINDINGS
Left atrial appendage occlusion with Watchmen      RCFV:  Left atrial appendage occlusion with Watchmen      RCFV:  15 Fr vascade

## 2024-06-04 NOTE — H&P ADULT - HISTORY OF PRESENT ILLNESS
Patient is a 76 yof with pmhx of HTN, HLD, CAD s/p stents, AF (on eliquis), and DM who presents to [facility] on 6/4 for planned Watchman procedure with Dr. Ortiz. She reports that she had a fall and was admitted to [facility] for management. During that admission, her eliquis was discontinued due to being a high fall risk. She now presents for watchman procedure.     Patient seen in same day holding area; Reports no changes to PMHx or medications since last seen by our team. Denies acute or current SOB, chest pain, palpitation, N/V/D, fever/chills, recent illness, or any other concerning symptoms.

## 2024-06-04 NOTE — CHART NOTE - NSCHARTNOTEFT_GEN_A_CORE
s/p watchman device       Access: RCFV 15 F, vascade closure   TVP: none   TR Band: none     VS: hypotensive on levo,   General: NAD  Neurological: AOx3. Motor skills grossly intact  Cardiovascular: Normal S1/S2. Regular rate/rhythm. No murmurs  Respiratory: Lungs CTA bilaterally. No wheezing or rales  Gastrointestinal: +BS in all 4 quadrants. Non-distended. Soft. Non-tender  Extremities: Strength 5/5 b/l upper/lower extremities. Sensation grossly intact upper/lower extremities. No edema. No calf tenderness.  Vascular: Radial 2+bilaterally, DP 2+ b/l  Groin sites: R groin site c/d/i and soft without hematoma     Bed rest: 2 hours   Eliquis tonight at 10PM if no effusion on ECHO tonight   Dispo: home when medically ready   TTE/EKG ordered: yes

## 2024-06-04 NOTE — PRE-ANESTHESIA EVALUATION ADULT - NSANTHOSAYNRD_GEN_A_CORE
No. GERA screening performed.  STOP BANG Legend: 0-2 = LOW Risk; 3-4 = INTERMEDIATE Risk; 5-8 = HIGH Risk

## 2024-06-04 NOTE — H&P ADULT - NSHPREVIEWOFSYSTEMS_GEN_ALL_CORE
Review of Systems  CONSTITUTIONAL:  Denies Fevers / chills, sweats, fatigue, weight loss, weight gain                                      NEURO:  Denies parethesias, seizures, syncope, confusion                                                                                EYES:  Denies Blurry vision, discharge, pain, loss of vision                                                                                    ENMT:  Denies Difficulty hearing, vertigo, dysphagia, epistaxis, recent dental work                                       CV:  Denies Chest pain, palpitations, HENDRICKS, orthopnea                                                                                          RESPIRATORY:  Denies Wheezing, SOB, cough / sputum, hemoptysis                                                                GI:  Denies Nausea, vomiting, diarrhea, constipation, melena, difficulty swallowing                                               : Denies Hematuria, dysuria, urgency, incontinence                                                                                         MUSCULOSKELETAL:  Denies arthritis, joint swelling, muscle weakness                                                             SKIN/BREAST:  Denies rash, itching, hair loss, masses                                                                                            PSYCH:  Denies depression, anxiety, suicidal ideation                                                                                               HEME/LYMPH:  Denies bruises easily, enlarged lymph nodes, tender lymph nodes                                        ENDOCRINE:  Denies cold intolerance, heat intolerance, polydipsia

## 2024-06-04 NOTE — BRIEF OPERATIVE NOTE - NSICDXBRIEFPROCEDURE_GEN_ALL_CORE_FT
PROCEDURES:  Insertion, Watchman left atrial appendage closure device 04-Jun-2024 16:08:21  Juventino Bran

## 2024-06-04 NOTE — H&P ADULT - ASSESSMENT
Patient is a 76 yof with pmhx of HTN, HLD, CAD s/p stents, AF (on eliquis), and DM who presents to [facility] on 6/4 for planned Watchman procedure with Dr. Ortiz. She reports that she had a fall and was admitted to [facility] for management. During that admission, her eliquis was discontinued due to being a high fall risk. She now presents for watchman procedure.     Plan  - Watchman   - Admit under Dr. Ortiz  via same day surgery. Consent signed, placed on chart.  Risks/benefits reviewed, patient understands and agrees. T&S ordered and blood products placed on hold for OR.  To 9  post-op.

## 2024-06-05 ENCOUNTER — TRANSCRIPTION ENCOUNTER (OUTPATIENT)
Age: 79
End: 2024-06-05

## 2024-06-05 ENCOUNTER — RESULT REVIEW (OUTPATIENT)
Age: 79
End: 2024-06-05

## 2024-06-05 VITALS
HEART RATE: 81 BPM | SYSTOLIC BLOOD PRESSURE: 133 MMHG | RESPIRATION RATE: 18 BRPM | DIASTOLIC BLOOD PRESSURE: 60 MMHG | OXYGEN SATURATION: 100 %

## 2024-06-05 LAB
A1C WITH ESTIMATED AVERAGE GLUCOSE RESULT: 9.8 % — HIGH (ref 4–5.6)
ALBUMIN SERPL ELPH-MCNC: 3.5 G/DL — SIGNIFICANT CHANGE UP (ref 3.3–5)
ALP SERPL-CCNC: 135 U/L — HIGH (ref 40–120)
ALT FLD-CCNC: 10 U/L — SIGNIFICANT CHANGE UP (ref 10–45)
ANION GAP SERPL CALC-SCNC: 10 MMOL/L — SIGNIFICANT CHANGE UP (ref 5–17)
ANION GAP SERPL CALC-SCNC: 10 MMOL/L — SIGNIFICANT CHANGE UP (ref 5–17)
APTT BLD: 37.1 SEC — HIGH (ref 24.5–35.6)
AST SERPL-CCNC: 19 U/L — SIGNIFICANT CHANGE UP (ref 10–40)
BASOPHILS # BLD AUTO: 0.02 K/UL — SIGNIFICANT CHANGE UP (ref 0–0.2)
BASOPHILS NFR BLD AUTO: 0.5 % — SIGNIFICANT CHANGE UP (ref 0–2)
BILIRUB SERPL-MCNC: 0.3 MG/DL — SIGNIFICANT CHANGE UP (ref 0.2–1.2)
BUN SERPL-MCNC: 36 MG/DL — HIGH (ref 7–23)
BUN SERPL-MCNC: 38 MG/DL — HIGH (ref 7–23)
CALCIUM SERPL-MCNC: 9.5 MG/DL — SIGNIFICANT CHANGE UP (ref 8.4–10.5)
CALCIUM SERPL-MCNC: 9.7 MG/DL — SIGNIFICANT CHANGE UP (ref 8.4–10.5)
CHLORIDE SERPL-SCNC: 103 MMOL/L — SIGNIFICANT CHANGE UP (ref 96–108)
CHLORIDE SERPL-SCNC: 104 MMOL/L — SIGNIFICANT CHANGE UP (ref 96–108)
CO2 SERPL-SCNC: 25 MMOL/L — SIGNIFICANT CHANGE UP (ref 22–31)
CO2 SERPL-SCNC: 25 MMOL/L — SIGNIFICANT CHANGE UP (ref 22–31)
CREAT SERPL-MCNC: 1.87 MG/DL — HIGH (ref 0.5–1.3)
CREAT SERPL-MCNC: 1.92 MG/DL — HIGH (ref 0.5–1.3)
EGFR: 26 ML/MIN/1.73M2 — LOW
EGFR: 27 ML/MIN/1.73M2 — LOW
EOSINOPHIL # BLD AUTO: 0.07 K/UL — SIGNIFICANT CHANGE UP (ref 0–0.5)
EOSINOPHIL NFR BLD AUTO: 1.6 % — SIGNIFICANT CHANGE UP (ref 0–6)
ESTIMATED AVERAGE GLUCOSE: 235 MG/DL — HIGH (ref 68–114)
GAS PNL BLDA: SIGNIFICANT CHANGE UP
GLUCOSE BLDC GLUCOMTR-MCNC: 288 MG/DL — HIGH (ref 70–99)
GLUCOSE BLDC GLUCOMTR-MCNC: 331 MG/DL — HIGH (ref 70–99)
GLUCOSE BLDC GLUCOMTR-MCNC: 353 MG/DL — HIGH (ref 70–99)
GLUCOSE SERPL-MCNC: 237 MG/DL — HIGH (ref 70–99)
GLUCOSE SERPL-MCNC: 308 MG/DL — HIGH (ref 70–99)
HCT VFR BLD CALC: 23.7 % — LOW (ref 34.5–45)
HCT VFR BLD CALC: 24.8 % — LOW (ref 34.5–45)
HGB BLD-MCNC: 7.6 G/DL — LOW (ref 11.5–15.5)
HGB BLD-MCNC: 8 G/DL — LOW (ref 11.5–15.5)
IMM GRANULOCYTES NFR BLD AUTO: 0.2 % — SIGNIFICANT CHANGE UP (ref 0–0.9)
INR BLD: 1.32 — HIGH (ref 0.85–1.18)
LYMPHOCYTES # BLD AUTO: 0.71 K/UL — LOW (ref 1–3.3)
LYMPHOCYTES # BLD AUTO: 16.1 % — SIGNIFICANT CHANGE UP (ref 13–44)
MAGNESIUM SERPL-MCNC: 1.7 MG/DL — SIGNIFICANT CHANGE UP (ref 1.6–2.6)
MCHC RBC-ENTMCNC: 29.5 PG — SIGNIFICANT CHANGE UP (ref 27–34)
MCHC RBC-ENTMCNC: 29.8 PG — SIGNIFICANT CHANGE UP (ref 27–34)
MCHC RBC-ENTMCNC: 32.1 GM/DL — SIGNIFICANT CHANGE UP (ref 32–36)
MCHC RBC-ENTMCNC: 32.3 GM/DL — SIGNIFICANT CHANGE UP (ref 32–36)
MCV RBC AUTO: 91.5 FL — SIGNIFICANT CHANGE UP (ref 80–100)
MCV RBC AUTO: 92.9 FL — SIGNIFICANT CHANGE UP (ref 80–100)
MONOCYTES # BLD AUTO: 0.42 K/UL — SIGNIFICANT CHANGE UP (ref 0–0.9)
MONOCYTES NFR BLD AUTO: 9.5 % — SIGNIFICANT CHANGE UP (ref 2–14)
NEUTROPHILS # BLD AUTO: 3.19 K/UL — SIGNIFICANT CHANGE UP (ref 1.8–7.4)
NEUTROPHILS NFR BLD AUTO: 72.1 % — SIGNIFICANT CHANGE UP (ref 43–77)
NRBC # BLD: 0 /100 WBCS — SIGNIFICANT CHANGE UP (ref 0–0)
NRBC # BLD: 0 /100 WBCS — SIGNIFICANT CHANGE UP (ref 0–0)
PHOSPHATE SERPL-MCNC: 3.8 MG/DL — SIGNIFICANT CHANGE UP (ref 2.5–4.5)
PLATELET # BLD AUTO: 104 K/UL — LOW (ref 150–400)
PLATELET # BLD AUTO: 96 K/UL — LOW (ref 150–400)
POTASSIUM SERPL-MCNC: 4.2 MMOL/L — SIGNIFICANT CHANGE UP (ref 3.5–5.3)
POTASSIUM SERPL-MCNC: 4.6 MMOL/L — SIGNIFICANT CHANGE UP (ref 3.5–5.3)
POTASSIUM SERPL-SCNC: 4.2 MMOL/L — SIGNIFICANT CHANGE UP (ref 3.5–5.3)
POTASSIUM SERPL-SCNC: 4.6 MMOL/L — SIGNIFICANT CHANGE UP (ref 3.5–5.3)
PROT SERPL-MCNC: 6.6 G/DL — SIGNIFICANT CHANGE UP (ref 6–8.3)
PROTHROM AB SERPL-ACNC: 14.9 SEC — HIGH (ref 9.5–13)
RBC # BLD: 2.55 M/UL — LOW (ref 3.8–5.2)
RBC # BLD: 2.71 M/UL — LOW (ref 3.8–5.2)
RBC # FLD: 14.3 % — SIGNIFICANT CHANGE UP (ref 10.3–14.5)
RBC # FLD: 14.6 % — HIGH (ref 10.3–14.5)
SODIUM SERPL-SCNC: 138 MMOL/L — SIGNIFICANT CHANGE UP (ref 135–145)
SODIUM SERPL-SCNC: 139 MMOL/L — SIGNIFICANT CHANGE UP (ref 135–145)
TSH SERPL-MCNC: 1.41 UIU/ML — SIGNIFICANT CHANGE UP (ref 0.27–4.2)
WBC # BLD: 4.42 K/UL — SIGNIFICANT CHANGE UP (ref 3.8–10.5)
WBC # BLD: 5.72 K/UL — SIGNIFICANT CHANGE UP (ref 3.8–10.5)
WBC # FLD AUTO: 4.42 K/UL — SIGNIFICANT CHANGE UP (ref 3.8–10.5)
WBC # FLD AUTO: 5.72 K/UL — SIGNIFICANT CHANGE UP (ref 3.8–10.5)

## 2024-06-05 PROCEDURE — 93308 TTE F-UP OR LMTD: CPT | Mod: 26,59

## 2024-06-05 PROCEDURE — 36415 COLL VENOUS BLD VENIPUNCTURE: CPT

## 2024-06-05 PROCEDURE — 84443 ASSAY THYROID STIM HORMONE: CPT

## 2024-06-05 PROCEDURE — 85025 COMPLETE CBC W/AUTO DIFF WBC: CPT

## 2024-06-05 PROCEDURE — C1769: CPT

## 2024-06-05 PROCEDURE — 82330 ASSAY OF CALCIUM: CPT

## 2024-06-05 PROCEDURE — P9045: CPT

## 2024-06-05 PROCEDURE — 82803 BLOOD GASES ANY COMBINATION: CPT

## 2024-06-05 PROCEDURE — 97161 PT EVAL LOW COMPLEX 20 MIN: CPT

## 2024-06-05 PROCEDURE — 84132 ASSAY OF SERUM POTASSIUM: CPT

## 2024-06-05 PROCEDURE — 86850 RBC ANTIBODY SCREEN: CPT

## 2024-06-05 PROCEDURE — 83036 HEMOGLOBIN GLYCOSYLATED A1C: CPT

## 2024-06-05 PROCEDURE — 86900 BLOOD TYPING SEROLOGIC ABO: CPT

## 2024-06-05 PROCEDURE — 84295 ASSAY OF SERUM SODIUM: CPT

## 2024-06-05 PROCEDURE — 93005 ELECTROCARDIOGRAM TRACING: CPT

## 2024-06-05 PROCEDURE — 83735 ASSAY OF MAGNESIUM: CPT

## 2024-06-05 PROCEDURE — 85027 COMPLETE CBC AUTOMATED: CPT

## 2024-06-05 PROCEDURE — 82550 ASSAY OF CK (CPK): CPT

## 2024-06-05 PROCEDURE — 82962 GLUCOSE BLOOD TEST: CPT

## 2024-06-05 PROCEDURE — 85610 PROTHROMBIN TIME: CPT

## 2024-06-05 PROCEDURE — C9399: CPT

## 2024-06-05 PROCEDURE — 93306 TTE W/DOPPLER COMPLETE: CPT | Mod: 26

## 2024-06-05 PROCEDURE — 80053 COMPREHEN METABOLIC PANEL: CPT

## 2024-06-05 PROCEDURE — 71045 X-RAY EXAM CHEST 1 VIEW: CPT | Mod: 26

## 2024-06-05 PROCEDURE — C1760: CPT

## 2024-06-05 PROCEDURE — C1893: CPT

## 2024-06-05 PROCEDURE — 71045 X-RAY EXAM CHEST 1 VIEW: CPT

## 2024-06-05 PROCEDURE — 85730 THROMBOPLASTIN TIME PARTIAL: CPT

## 2024-06-05 PROCEDURE — 93321 DOPPLER ECHO F-UP/LMTD STD: CPT

## 2024-06-05 PROCEDURE — 93312 ECHO TRANSESOPHAGEAL: CPT

## 2024-06-05 PROCEDURE — 93010 ELECTROCARDIOGRAM REPORT: CPT

## 2024-06-05 PROCEDURE — 86901 BLOOD TYPING SEROLOGIC RH(D): CPT

## 2024-06-05 PROCEDURE — 86923 COMPATIBILITY TEST ELECTRIC: CPT

## 2024-06-05 PROCEDURE — C8929: CPT

## 2024-06-05 PROCEDURE — C1894: CPT

## 2024-06-05 PROCEDURE — 83880 ASSAY OF NATRIURETIC PEPTIDE: CPT

## 2024-06-05 PROCEDURE — 80048 BASIC METABOLIC PNL TOTAL CA: CPT

## 2024-06-05 PROCEDURE — 84100 ASSAY OF PHOSPHORUS: CPT

## 2024-06-05 PROCEDURE — C1887: CPT

## 2024-06-05 PROCEDURE — C1889: CPT

## 2024-06-05 RX ORDER — LOSARTAN POTASSIUM 100 MG/1
25 TABLET, FILM COATED ORAL ONCE
Refills: 0 | Status: COMPLETED | OUTPATIENT
Start: 2024-06-05 | End: 2024-06-05

## 2024-06-05 RX ORDER — ACETAMINOPHEN 500 MG
2 TABLET ORAL
Qty: 0 | Refills: 0 | DISCHARGE
Start: 2024-06-05

## 2024-06-05 RX ORDER — LOSARTAN POTASSIUM 100 MG/1
50 TABLET, FILM COATED ORAL DAILY
Refills: 0 | Status: DISCONTINUED | OUTPATIENT
Start: 2024-06-06 | End: 2024-06-05

## 2024-06-05 RX ORDER — HUMAN INSULIN 100 [IU]/ML
8 INJECTION, SUSPENSION SUBCUTANEOUS ONCE
Refills: 0 | Status: COMPLETED | OUTPATIENT
Start: 2024-06-05 | End: 2024-06-05

## 2024-06-05 RX ORDER — ONDANSETRON 8 MG/1
4 TABLET, FILM COATED ORAL ONCE
Refills: 0 | Status: COMPLETED | OUTPATIENT
Start: 2024-06-05 | End: 2024-06-05

## 2024-06-05 RX ORDER — APIXABAN 2.5 MG/1
1 TABLET, FILM COATED ORAL
Refills: 0 | DISCHARGE

## 2024-06-05 RX ORDER — LOSARTAN POTASSIUM 100 MG/1
25 TABLET, FILM COATED ORAL DAILY
Refills: 0 | Status: DISCONTINUED | OUTPATIENT
Start: 2024-06-05 | End: 2024-06-05

## 2024-06-05 RX ORDER — SENNA PLUS 8.6 MG/1
2 TABLET ORAL
Qty: 60 | Refills: 0
Start: 2024-06-05 | End: 2024-07-04

## 2024-06-05 RX ORDER — APIXABAN 2.5 MG/1
1 TABLET, FILM COATED ORAL
Qty: 60 | Refills: 0
Start: 2024-06-05 | End: 2024-07-04

## 2024-06-05 RX ORDER — INSULIN LISPRO 100/ML
4 VIAL (ML) SUBCUTANEOUS
Refills: 0 | Status: DISCONTINUED | OUTPATIENT
Start: 2024-06-05 | End: 2024-06-05

## 2024-06-05 RX ADMIN — LOSARTAN POTASSIUM 25 MILLIGRAM(S): 100 TABLET, FILM COATED ORAL at 11:04

## 2024-06-05 RX ADMIN — Medication 100 MILLIGRAM(S): at 05:30

## 2024-06-05 RX ADMIN — ANASTROZOLE 1 MILLIGRAM(S): 1 TABLET ORAL at 11:03

## 2024-06-05 RX ADMIN — CHLORHEXIDINE GLUCONATE 1 APPLICATION(S): 213 SOLUTION TOPICAL at 11:05

## 2024-06-05 RX ADMIN — ONDANSETRON 4 MILLIGRAM(S): 8 TABLET, FILM COATED ORAL at 05:30

## 2024-06-05 RX ADMIN — APIXABAN 2.5 MILLIGRAM(S): 2.5 TABLET, FILM COATED ORAL at 05:30

## 2024-06-05 RX ADMIN — Medication 8: at 11:59

## 2024-06-05 RX ADMIN — HUMAN INSULIN 8 UNIT(S): 100 INJECTION, SUSPENSION SUBCUTANEOUS at 11:04

## 2024-06-05 RX ADMIN — APIXABAN 2.5 MILLIGRAM(S): 2.5 TABLET, FILM COATED ORAL at 00:35

## 2024-06-05 RX ADMIN — LOSARTAN POTASSIUM 25 MILLIGRAM(S): 100 TABLET, FILM COATED ORAL at 08:03

## 2024-06-05 RX ADMIN — Medication 100 MILLIGRAM(S): at 13:50

## 2024-06-05 RX ADMIN — Medication 4 UNIT(S): at 11:58

## 2024-06-05 RX ADMIN — ONDANSETRON 4 MILLIGRAM(S): 8 TABLET, FILM COATED ORAL at 03:51

## 2024-06-05 RX ADMIN — Medication 10: at 06:58

## 2024-06-05 NOTE — PHYSICAL THERAPY INITIAL EVALUATION ADULT - GENERAL OBSERVATIONS, REHAB EVAL
Patient encountered semi-supine in bed in no apparent distress +heplock +tele +pulseOx +primafit +alex.

## 2024-06-05 NOTE — DISCHARGE NOTE PROVIDER - NSDCFUADDINST_GEN_ALL_CORE_FT
Patient discharged to [facility] through ambulance.   -Walk daily as tolerated and use your incentive spirometer 10 times every hour while you are awake.     -Please weigh yourself daily. If you notice over a 3 pound weight gain in 3 days, this is a sign you are likely retaining too much fluid. It is imperative you call our right away with unexplained rapid weight gain.      -Please continue to wear the compression stockings given to you in the hospital at home. This is a way to prevent fluid from building up in your legs.     -No driving or strenuous activity/exercise until cleared by your surgeon.    -Gently clean your incisions with unscented/antibacterial soap and water, pat dry.  You may leave them open to air.    -Call your doctor if you have shortness of breath, chest pain not relieved by pain medication, dizziness, fever >100.5, or increased redness or drainage from incisions.

## 2024-06-05 NOTE — DISCHARGE NOTE NURSING/CASE MANAGEMENT/SOCIAL WORK - CAREGIVER ADDRESS
"2019    RE: Raven Carlin, : 1940      Raven returns to discuss imaging and surgery.     Exam not repeated.     Calcium and PTH are elevated.  Osteopenia in the hips by DEXA  4 D CT shows suspicious lesion in the left lower neck.  The two findings anterior to the larynx which enhance are absolutely not in the location one would expect to find parathyroid glands, so those are false +.     Assessment:    Criteria for surgery is met with osteopenia.  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  I recommend a minimally invasive neck exploration with the sestimibi injection to minimize the operative incision and the rapid PTH assay to assess the completeness of the procedure.  Raven is aware of the risks to the recurrent laryngeal nerve and the risk of hypocalcemia, particularly with treatment of  parathyroid hyperplasia.  She is also aware of the 1-2 % risk of \"failure to cure\".     Plan: schedule for surgery.    Latonia Anguiano MD     " /

## 2024-06-05 NOTE — DISCHARGE NOTE NURSING/CASE MANAGEMENT/SOCIAL WORK - NSDCFUADDAPPT_GEN_ALL_CORE_FT
Regarding your follow up visits, our office will call you with the scheduled dates and times. If you do not receive a call by 6/6 please call [phone].

## 2024-06-05 NOTE — DISCHARGE NOTE NURSING/CASE MANAGEMENT/SOCIAL WORK - NSDCVIVACCINE_GEN_ALL_CORE_FT
COVID-19 vaccine, vector-nr, rS-Ad26, PF, 0.5 mL (Cynthia); 02-Apr-2021 13:30; Naomi Izaguirre (ANDRESSA); Cynthia; [id number] (Exp. Date: 09-Jun-2021); IntraMuscular; Deltoid Right.; 0.5 milliLiter(s);

## 2024-06-05 NOTE — DISCHARGE NOTE PROVIDER - HOSPITAL COURSE
Patient discussed on morning rounds with  _____    Operation Date:    Primary Surgeon/Attending MD:    Referring Physician:  _ _ _ _ _ _ _ _ _ _ _ _  HOSPITAL COURSE:    _ _ _ _ _ _ _ _ _ _ _ _  DISCHARGE PHYSICAL EXAM:  General:  Neuro:  Cardio:  Pulm:  GI/:  Vascular:  Extremities:  Incisions:  _ _ _ _ _ _ _ _ _ _ _ _  REMOVAL CHECKLIST:        [ ] Epicardial wires          [ ] Stitches/tie downs,   If no, why? ______          [ ] PICC/Midline,   If no, why? ________  _ _ _ _ _ _ _ _ _ _ _ _   MEDICATION DISCHARGE CHECKLIST    CABG        [ ] Aspirin, [  ] Contraindicated, Reason_______________________________        [ ] Plavix, [  ] Contraindicated, Reason_______________________________        [ ] Statin, [  ] Contraindicated, Reason_______________________________        [ ] Lasix , [  ] Contraindicated, Reason_______________________________              Duration: _____        [ ] Beta-Blocker, [  ] Contraindicated, Reason_______________________________       Surgical Valve        [ ] Aspirin, [  ] Contraindicated, Reason_______________________________        [ ] Lasix, [  ] Contraindicated, Reason_______________________________             Duration: _____        [ ] Beta-Blocker, [  ] Contraindicated, Reason_______________________________        TAVR Valve        [ ] Aspirin, [  ] Contraindicated, Reason_______________________________        [ ] Plavix, [ ] Contraindicated, Reason _______________________________        PCI        [ ] Aspirin, [  ] Contraindicated, Reason_______________________________        [ ] Plavix, [ ] Contraindicated, Reason _______________________________        Anticoagulation        [ ] NOAC, [ ] Reason _______________________________              Cost/Insurance barriers addressed: YES/NO         [ ] Coumadin, [ ] Reason _______________________________              Dose:___________              INR Goal: ___________              Follow up established: YES/NO  _ _ _ _ _ _ _ _ _ _ _ _  RELEVANT LABS/IMAGING:    _ _ _ _ _ _ _ _ _ _ _ _  CLINICAL FOLLOW UP NEEDS:     [ ] Labwork           Labs needed:           When/Timing:           Outpatient team aware: YES/NO         [ ] Imaging            Type:           When/Timing:           Outpatient team aware: YES/NO       [ ] Home equipment           Type: (i.e. wound vac, pneumostat, prevena, wet/dry dressings, picc/midlines, MCOT, lópez etc)           Specific needs:           Outpatient team aware: YES/NO  _ _ _ _ _ _ _ _ _ _ _ _  Over 35 minutes was spent with the patient reviewing the discharge material including medications, follow up appointments, recovery, concerning symptoms, and how to contact their health care providers if they have questions Patient discussed on morning rounds with Dr. Ortiz and Dr. Tatum     Operation Date: 6/4: Beatriz     Primary Surgeon/Attending MD: Dr. Ortiz     Referring Physician: Dr. Tatum   _ _ _ _ _ _ _ _ _ _ _ _  HOSPITAL COURSE:  76 yof with pmhx of HTN, HLD, CAD s/p stents, AF (on eliquis), and DM who presents to [facility] on 6/4 for planned Watchman procedure with Dr. Ortiz. She reports that she had a fall and was admitted to [facility] for management. During that admission, her eliquis was discontinued due to being a high fall risk. Pt presented for watchman procedure with Dr. Ortiz and Dr. Tatum on 6/4. POD0 intraop uncomplicated and pt recovered in [address] as a mini ICU. POD 1 BGL elevated, initially consulted endo however pts daughter did not want to wait for endo final recs and per pt and daughter pt follows closely with endo in [facility] and takes insulin at home. Discussed with Dr. Tatum and he will follow up with her in [facility] with her glucose levels. repeat CBC showed stability of H/H, TTE preformed trival pericardial effusion, pt was deemed medically stable for discharge by Dr. Tatum and Dr. Ortiz on 6/5. Pt was started on eliquis 2.5 BID per Dr. Ortiz. All medications which needed refills sent to [facility]. At time of discharge pt tolerated a po diet, ambulated without assistance and had a post op BM. Pt denies dizziness, vision changes, chest pain, palpitations, shortness of breath, cough, n/v/d, extremity swelling, calf tenderness.   _ _ _ _ _ _ _ _ _ _ _ _  DISCHARGE PHYSICAL EXAM:  GEN: NAD, looks comfortable  Psych: Mood appropriate  Neuro: A&Ox3.  No focal deficits.  Moving all extremities.   HEENT: No obvious abnormalities  CV: S1S2, regular, no murmurs appreciated.  No carotid bruits.  No JVD  Lungs: Clear B/L.  No wheezing, rales or rhonchi  ABD: Soft, non-tender, non-distended.   EXT: Warm and well perfused.  No peripheral edema noted  Musculoskeletal: Moving all extremities with normal ROM, no joint swelling  PV: Pedal pulses palpable  Incisions: Bilateral groin sites are clean dry and intact without drainage or bleeding noted. no hematoma formation   _ _ _ _ _ _ _ _ _ _ _ _  REMOVAL CHECKLIST:        [X] Epicardial wires          [X] Stitches/tie downs,   If no, why? ______  _ _ _ _ _ _ _ _ _ _ _ _   MEDICATION DISCHARGE CHECKLIST        Anticoagulation        [X] NOAC, [ ] Reason ____Afib              Cost/Insurance barriers addressed: YES  _ _ _ _ _ _ _ _ _ _ _ _  RELEVANT LABS/IMAGING:  < from: Xray Chest 1 View- PORTABLE-Routine (06.05.24 @ 06:42) >    Findings/  impression: Hyperinflation. Right upper lobe opacity Heart size within   normal limits, thoracic aortic calcification, watchman device.. . Stable   bony structures. Eggshell calcification overlies the left lung medial   apex.    --- End of Report ---    < end of copied text >  _ _ _ _ _ _ _ _ _ _ _ _  Over 35 minutes was spent with the patient reviewing the discharge material including medications, follow up appointments, recovery, concerning symptoms, and how to contact their health care providers if they have questions

## 2024-06-05 NOTE — DISCHARGE NOTE PROVIDER - CARE PROVIDER_API CALL
Elke Ortiz  Cardiac Electrophysiology  [address], [address] [place] [address]  Phone: [phone]  Fax: [phone]  Scheduled Appointment: 06/10/2024    Bolivar Tatum  Interventional Cardiology  [address], [address]  [place] [address]  Phone: [phone]  Fax: [phone]  Follow Up Time: 1 week

## 2024-06-05 NOTE — PHYSICAL THERAPY INITIAL EVALUATION ADULT - PERTINENT HX OF CURRENT PROBLEM, REHAB EVAL
Patient is a 76 yof with pmhx of HTN, HLD, CAD s/p stents, AF (on eliquis), and DM who presents to [facility] on 6/4 for planned Watchman procedure with Dr. Ortiz. She reports that she had a fall and was admitted to [facility] for management. During that admission, her eliquis was discontinued due to being a high fall risk. She now presents for watchman procedure. Patient is s/p watchman procedure on 6/4/2024.

## 2024-06-05 NOTE — DISCHARGE NOTE PROVIDER - NSDCFUADDAPPT_GEN_ALL_CORE_FT
Regarding your follow up visits, our office will call you with the scheduled dates and times. If you do not receive a call by 6/6 please call [phone].

## 2024-06-05 NOTE — DISCHARGE NOTE PROVIDER - NSDCCPCAREPLAN_GEN_ALL_CORE_FT
Initial Clinical Review    Admission: Date/Time/Statement:   Admission Orders (From admission, onward)     Ordered        05/10/23 1414  Inpatient Admission  Once                      Orders Placed This Encounter   Procedures   • Inpatient Admission     Standing Status:   Standing     Number of Occurrences:   1     Order Specific Question:   Level of Care     Answer:   Med Surg [16]     Order Specific Question:   Estimated length of stay     Answer:   More than 2 Midnights     Order Specific Question:   Certification     Answer:   I certify that inpatient services are medically necessary for this patient for a duration of greater than two midnights  See H&P and MD Progress Notes for additional information about the patient's course of treatment  Arrival Information     Patient transfer from [facility] as higher level of care due to need OB                      chief complaint: abdominal pain and lightheadedness      Initial Presentation: 35 y o  female from [facility] ED via ems admitted inpatient due to Ruptured ectopic pregnancy/Hemorrhagic Shock  Initially presented to [place] due to abdominal pain and lightheadedness, nausea starting day of arrival   @ 3 months pregnant  Found hypotensive 87/40, tachycardic 119 and hypothermic 90 1  H&H 13 5/44  1  Wbc 19 90 US showed ruptured ectopic pregnancy, large hematoma and free fluid  Given IVF, PRBC x 2   transfer to [facility] for emergent OR       5/10/23 Procedure EXPLORATORY LAPAROTOMY FOR RUPTURE ECTOPIC PREGNANCY, LEFT SALPINGECTOMY, EVACUATION OF HEMOPERITONEUM   Findings Large Hemoperitoneum  Ruptured Left Ectopic pregnancy  Normal Ovaries B/L  Normal Uterus    5/10/23 per acute pain - patient has history of chronic pain  Dilaudid PCA is appropriate  Continuous rate 0 mg/h, PCA dose 0 2 mg, lockout 10 minutes and 1 hour maximum 1 2 mg    Would avoid ordering any other opioid pain medications along with PCA     5/10/23 per Critical care - patient with ectopic pregnancy/Hemorrhagic shock  POD 0 for Exlap, removal of large hemoperitoneum and cleared Ruptured ectopic pregnancy of Lt tube  ESBL 2L  Pt received 6 PRBC and 2 FFP  Plan is SDU  Dilaudid as needed  NPO  Trend WBC and fever curve  Date:  5/11/23    Day 2: POD #1 for Exlap, removal of large hemoperitoneum and cleared Ruptured ectopic pregnancy of Lt tube  Has some abdominal pain  Controlled  Taking small sips water  On exam: abdominal tenderness  Pfannenstiel incision c/d/i  Covered in steri strips  Hernandez catheter  H&H 13 6/39 7  Attempt OOB  Consider advancing diet  If can tolerate oral then po regimen for pain  Dc arterial and central access  Continue IVF  Monitor Hgb       ED Triage Vitals   Temperature Pulse Respirations Blood Pressure SpO2   05/10/23 1230 05/10/23 1230 05/10/23 1230 05/10/23 1256 05/10/23 1230   (!) 97 4 °F (36 3 °C) (!) 110 (!) 26 139/66 94 %      Temp Source Heart Rate Source Patient Position - Orthostatic VS BP Location FiO2 (%)   05/10/23 1256 05/10/23 1256 05/11/23 0302 05/11/23 0302 --   Temporal Monitor Lying Left arm       Pain Score       05/10/23 1245       10 - Worst Possible Pain          Wt Readings from Last 1 Encounters:   05/11/23 55 kg (121 lb 4 1 oz)     Additional Vital Signs:   05/11/23 0800 -- 83 15 102/55 73 108/67 85 mmHg 93 % -- -- -- --   05/11/23 0700 98 2 °F (36 8 °C) 83 23 Abnormal  100/59 74 110/68 86 mmHg 93 % -- -- -- --   05/11/23 0600 -- 97 32 Abnormal  100/62 77 123/70 90 mmHg 94 % -- -- -- --   05/11/23 0500 -- 91 25 Abnormal  101/59 75 117/64 84 mmHg 92 % -- -- -- --   05/11/23 0400 -- 86 18 105/64 79 134/70 92 mmHg 94 % -- -- -- --   05/11/23 0302 98 °F (36 7 °C) -- -- -- -- -- -- -- -- None (Room air) -- Lying   05/11/23 0300 -- 82 19 100/56 73 129/66 87 mmHg 94 % -- -- -- --   05/11/23 0200 -- 80 16 108/55 75 134/66 86 mmHg 95 % -- -- -- --   05/11/23 0100 -- 82 17 101/61 76 138/69 90 mmHg 94 % -- -- -- -- 05/11/23 0000 -- 84 18 110/58 78 130/67 88 mmHg 95 % -- -- -- --   05/10/23 2300 -- 83 17 101/58 74 127/66 87 mmHg 95 % -- -- -- --   05/10/23 2200 -- 92 -- 98/60 74 127/67 88 mmHg 96 % -- -- -- --   05/10/23 2100 -- 86 37 Abnormal  106/66 81 135/72 93 mmHg 96 % -- -- -- --   05/10/23 2000 -- 80 26 Abnormal  -- -- 129/70 90 mmHg 95 % -- -- -- --   05/10/23 1900 98 4 °F (36 9 °C) 88 30 Abnormal  -- -- 122/70 90 mmHg 96 % -- -- -- --   05/10/23 1800 -- 92 40 Abnormal  -- -- 117/71 89 mmHg 95 % -- -- -- --   05/10/23 1700 -- 92 28 Abnormal  -- -- 116/71 89 mmHg 95 % -- -- -- --   05/10/23 1600 -- 93 34 Abnormal  113/75 88 -- -- 95 % -- -- -- --   05/10/23 1556 98 3 °F (36 8 °C) 95 -- 113/75 88 -- -- -- -- -- -- --   05/10/23 1530 100 3 °F (37 9 °C) 91 24 Abnormal  105/73 -- -- -- 98 % -- None (Room air) -- --   05/10/23 1500 -- 84 19 110/74 -- -- -- -- -- -- -- --   05/10/23 1415 98 5 °F (36 9 °C) 84 18 122/73 94 -- -- 93 % -- None (Room air) WDL --   05/10/23 1400 -- 86 23 Abnormal  128/71 93 -- -- 92 % -- None (Room air) -- --   05/10/23 1300 98 1 °F (36 7 °C) 110 Abnormal  23 Abnormal  142/70 -- -- -- 97 % 5 L/min Simple mask       Pertinent Labs/Diagnostic Test Results:   XR chest portable   Final Result by Jude Broderick MD (05/11 4833)      1  Central catheter on the right terminating in the SVC  2  Diffuse abnormal airspace disease in the left lung  Correlate for multifocal pneumonia versus asymmetric pulmonary edema  The study was marked in EPIC for significant notification  Workstation performed: [id number]           5/1023 US OB < 14 weeks with transvaginal - Ruptured ectopic pregnancy in the left adnexa with associated large pelvic hematoma and moderate remote free fluid in the upper abdomen    Recommend expedited surgical consultation      Review of imaging from 2014 showed past history of drained abscess in the left hemipelvis which may have predisposed to scarring and development of ectopic pregnancy      Results from last 7 days   Lab Units 05/11/23  0452 05/10/23  1628 05/10/23  1254 05/10/23  1155 05/10/23  1151 05/10/23  1144 05/10/23  1126 05/06/23  1028   WBC Thousand/uL 8 90 11 47*  --   --   --   --  19 90* 6 69   HEMOGLOBIN g/dL 13 6 15 2  --   --   --   --  13 5 10 9*   I STAT HEMOGLOBIN g/dl  --   --  16 0*  --  15 6* 16 3*  --   --    HEMATOCRIT % 39 7 44 6  --   --   --   --  44 1 35 7   HEMATOCRIT, ISTAT %  --   --  47*  --  46 48*  --   --    PLATELETS Thousands/uL 97* 101*  --  109*  --   --  108* 319   NEUTROS ABS Thousands/µL 7 22  --   --   --   --   --   --  4 77   BANDS PCT %  --   --   --   --   --   --  9*  --      Results from last 7 days   Lab Units 05/11/23  0452 05/10/23  1628 05/10/23  1254 05/10/23  1151 05/10/23  1144 05/10/23  1126   SODIUM mmol/L 137 139  --   --   --  138   POTASSIUM mmol/L 3 8 4 2  --   --   --  5 2   CHLORIDE mmol/L 106 109*  --   --   --  107   CO2 mmol/L 23 23  --   --   --  11*   CO2, I-STAT mmol/L  --   --  18* 15* 14*  --    ANION GAP mmol/L 8 7  --   --   --  20*   BUN mg/dL 12 9  --   --   --  10   CREATININE mg/dL 0 67 0 70  --   --   --  0 95   EGFR ml/min/1 73sq m 119 117  --   --   --  81   CALCIUM mg/dL 7 5* 7 9*  --   --   --  6 4*   CALCIUM, IONIZED mmol/L 1 05*  --   --   --   --   --    CALCIUM, IONIZED, ISTAT mmol/L  --   --  1 29 0 77* 0 85*  --    MAGNESIUM mg/dL 2 1 1 5*  --   --   --   --    PHOSPHORUS mg/dL 4 1 3 5  --   --   --   --      Results from last 7 days   Lab Units 05/11/23  0452 05/10/23  1628 05/10/23  1126   AST U/L 33 37 15   ALT U/L 15 18 11   ALK PHOS U/L 33* 30* 25*   TOTAL PROTEIN g/dL 4 9* 5 1* 4 2*   ALBUMIN g/dL 3 0* 3 2* 2 6*   TOTAL BILIRUBIN mg/dL 1 37* 1 38* 0 27     Results from last 7 days   Lab Units 05/11/23  0452 05/10/23  1628 05/10/23  1126   GLUCOSE RANDOM mg/dL 85 100 280*     Results from last 7 days   Lab Units 05/10/23  1628   PH JASON  7 319   PCO2 JASON mm Hg 45 1   PO2 JASON mm Hg 42 7 HCO3 JASON mmol/L 22 7*   BASE EXC JASON mmol/L -3 6   O2 CONTENT JASON ml/dL 17 4   O2 HGB, VENOUS % 78 6     Results from last 7 days   Lab Units 05/10/23  1254 05/10/23  1151 05/10/23  1144   PH, JASON I-STAT   --   --  6 995*   PCO2, JASON ISTAT mm HG  --   --  52 8*   PO2, JASON ISTAT mm HG  --   --  74 0*   HCO3, JASON ISTAT mmol/L  --   --  12 9*   I STAT BASE EXC mmol/L -10* -17* -19*   I STAT O2 SAT % 85 99* 84   ISTAT PH ART  7 232* 7 058*  --    I STAT ART PCO2 mm HG 40 4 47 5*  --    I STAT ART PO2 mm HG 59 0* 173 0*  --    I STAT ART HCO3 mmol/L 17 0* 13 4*  --      Results from last 7 days   Lab Units 05/11/23  0452 05/10/23  1155   PROTIME seconds 16 2* 18 2*   INR  1 28* 1 48*   PTT seconds  --  34     Results from last 7 days   Lab Units 05/10/23  1628   LACTIC ACID mmol/L 1 5     Results from last 7 days   Lab Units 05/06/23  1028   FERRITIN ng/mL 61     Results from last 7 days   Lab Units 05/10/23  1302 05/10/23  1126 05/10/23  1039 05/10/23  1038 05/10/23  1021   UNIT PRODUCT CODE  [id number]  [id number] [id number]  [id number]  E9029A50  C4539H78 S9393U26  K8548E93 N0010J68  F0183I71  [id number]  R7139Y52 P8168B01  C7771H55   UNIT NUMBER  [id number]  [id number]  [id number]  [id number]  [id number]  [id number]  [id number]  [id number]  [id number]   UNITABO  A  A O  O  O  O AB  AB O  O  O  O O  O   UNITRH  POS  POS POS  POS  POS  POS NEG  NEG POS  POS  POS  POS NEG  NEG   CROSSMATCH   --   --   --  Compatible  Compatible  Compatible  Compatible  --    UNIT DISPENSE STATUS  Return to Inv  Return to Inv Return to Inv  Return to Inv  Return to Inv  Return to Inv Presumed Trans  Presumed Trans Presumed Trans  Presumed Trans  Presumed Trans  Presumed Trans Presumed Trans  Presumed Trans   UNIT PRODUCT VOL ml 280  280 350  350  350  350 280  280 350 350  350  350 350  350     Results from last 7 days   Lab Units 05/06/23  1028   HEP B S AG  Non-reactive     Results from last 7 days   Lab Units 05/06/23  1028   URINE CULTURE  10,000-19,000 cfu/ml Escherichia coli*         No past medical history on file  Present on Admission:  • Ectopic pregnancy  • Metabolic acidosis  • Hemorrhagic shock (HCC)      Admitting Diagnosis: Ruptured ectopic pregnancy [O00 90]  Hemorrhagic shock ([address] ) [R57 8]  Age/Sex: 35 y o  female  Admission Orders:  05/10/23 1414 inpatient   Scheduled Medications:  chlorhexidine, 15 mL, Mouth/Throat, Q12H DENEEN    magnesium sulfate 2 g/50 mL IVPB (premix) 2 g  Dose: 2 g  Freq: Once Route: IV  Last Dose: Stopped (05/10/23 2213)  Start: 05/10/23 1900 End: 05/10/23 2213    Continuous IV Infusions:  multi-electrolyte, 75 mL/hr, Intravenous, Continuous      PRN Meds:  glycerin-hypromellose-, 2 drop, Both Eyes, Q4H PRN x 1 5/10  HYDROmorphone, 0 3 mg, Intravenous, Q2H PRN    Cardio Pulmonary monitoring   NPO: sips of clears  Transfused 5/10/23:  2 units FFP, 6 units PRBC    Network Utilization Review Department  ATTENTION: Please call with any questions or concerns to [phone] and carefully listen to the prompts so that you are directed to the right person  All voicemails are confidential   Sari Austin all requests for admission clinical reviews, approved or denied determinations and any other requests to dedicated fax number below belonging to the campus where the patient is receiving treatment   List of dedicated fax numbers for the Facilities:  [address] DENIALS (Administrative/Medical Necessity) [phone]   [address] (Maternity/NICU/Pediatrics) [phone]   [address] Eunice Lloyd [phone]   Arleen Montejo  [phone]   [facility] [place] [address], [address] [place] [address] [facility] [address] [facility] 310 [facility] [place] 134 645 [facility] [phone] PRINCIPAL DISCHARGE DIAGNOSIS  Diagnosis: Chronic atrial fibrillation  Assessment and Plan of Treatment:

## 2024-06-05 NOTE — DISCHARGE NOTE PROVIDER - PROVIDER TOKENS
PROVIDER:[TOKEN:[[id number]],SCHEDULEDAPPT:[06/10/2024]],PROVIDER:[TOKEN:[[id number]],FOLLOWUP:[1 week]]

## 2024-06-05 NOTE — PHYSICAL THERAPY INITIAL EVALUATION ADULT - ADDITIONAL COMMENTS
Patient lives in house with  +9STE. PTA, patient was independent with all ADLs and functional mobility, without the use of any AD. Patient has tub shower with grab bar and chair. Patient notes she has had 5 falls in the last 3 months.

## 2024-06-05 NOTE — DISCHARGE NOTE NURSING/CASE MANAGEMENT/SOCIAL WORK - PATIENT PORTAL LINK FT
You can access the FollowMyHealth Patient Portal offered by [facility] by registering at the following website: [email or web address]. By joining [facility]’s FollowMyHealth portal, you will also be able to view your health information using other applications (apps) compatible with our system.

## 2024-06-05 NOTE — CONSULT NOTE ADULT - SUBJECTIVE AND OBJECTIVE BOX
HISTORY OF PRESENT ILLNESS  NATALEE HERNANDEZ is a 79y Female with a past medical history of HTN, HLD, CAD s/p stents, AF (on eliquis), and DM who presents to [facility] on 6/4 for planned Watchman procedure with Dr. Ortiz.  Pt originally from [facility].  She previously had a fall, which entered her to the high fall risk, subsequently discontinued her on AC.       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       On admission, labs revealed BUN 40, Cr 1.88, glucose 273, a1c 9.8    Endocrinology has been consulted for Diabetes Management.    DIABETES HISTORY  - Age at diagnosis:   - Diabetes managed outpatient by:  - Current Therapy:  Tresiba  , Novolog , Tradjenta  - History of other regimens:   - History of hypoglycemia:   - History of DKA/HHS:   - Diabetic Complications:   - Home glucose readings:  - Diet:          > Breakfast:         > Lunch:        > Dinner:        > Snacks:    FAMILY HISTORY  - Diabetes:    SOCIAL HISTORY  - Work:  - Alcohol:  - Smoking:    ALLERGIES  NSAIDs (Swelling)  codeine (Unknown)      CURRENT MEDICATIONS  acetaminophen     Tablet .. 650 milliGRAM(s) Oral every 6 hours PRN  anastrozole 1 milliGRAM(s) Oral daily  apixaban 2.5 milliGRAM(s) Oral every 12 hours  atorvastatin 80 milliGRAM(s) Oral at bedtime  ceFAZolin   IVPB 2000 milliGRAM(s) IV Intermittent every 8 hours  chlorhexidine 2% Cloths 1 Application(s) Topical daily  dextrose 10% Bolus 125 milliLiter(s) IV Bolus once  dextrose 5%. 1000 milliLiter(s) IV Continuous <Continuous>  dextrose 5%. 1000 milliLiter(s) IV Continuous <Continuous>  dextrose 50% Injectable 25 Gram(s) IV Push once  dextrose 50% Injectable 12.5 Gram(s) IV Push once  dextrose Oral Gel 15 Gram(s) Oral once PRN  glucagon  Injectable 1 milliGRAM(s) IntraMuscular once  insulin lispro (ADMELOG) corrective regimen sliding scale   SubCutaneous Before meals and at bedtime  insulin lispro Injectable (ADMELOG) 4 Unit(s) SubCutaneous before lunch  losartan 25 milliGRAM(s) Oral daily  pantoprazole    Tablet 40 milliGRAM(s) Oral once  polyethylene glycol 3350 17 Gram(s) Oral daily  senna 2 Tablet(s) Oral at bedtime  sodium chloride 0.9%. 1000 milliLiter(s) IV Continuous <Continuous>      REVIEW OF SYSTEMS  Constitutional:  Negative fever, chills   Cardiovascular:  Negative for chest pain or palpitations.  Respiratory:  Negative for cough, wheezing, or shortness of breath.   Gastrointestinal:  Negative for nausea, vomiting, diarrhea, constipation, or abdominal pain.  Genitourinary:  Negative frequency, urgency or dysuria.  Neurologic:  No headache, confusion, dizziness    PHYSICAL EXAM  Vital Signs Last 24 Hrs  T(C): 36.7 (05 Jun 2024 08:58), Max: 36.8 (05 Jun 2024 00:51)  T(F): 98 (05 Jun 2024 08:58), Max: 98.2 (05 Jun 2024 00:51)  HR: 87 (05 Jun 2024 09:00) (71 - 106)  BP: 168/72 (05 Jun 2024 09:00) (93/52 - 168/72)  BP(mean): 104 (05 Jun 2024 09:00) (68 - 104)  RR: 18 (05 Jun 2024 09:00) (16 - 20)  SpO2: 100% (05 Jun 2024 09:00) (97% - 100%)    Parameters below as of 05 Jun 2024 09:00  Patient On (Oxygen Delivery Method): room air      Constitutional: Awake, alert  HEENT: Normocephalic, atraumatic, FAROOQ  Neck: supple, no acanthosis, no thyromegaly or palpable thyroid nodules.  Respiratory: Lungs clear to ausculation bilaterally.   Cardiovascular: regular rhythm, normal S1 and S2, no audible murmurs.   GI: soft, non-tender, non-distended, bowel sounds present  Extremities: No lower extremity edema, peripheral pulses present.     LABS  CBC - WBC/HGB/HTC/PLT: 5.72/8.0/24.8/104 (06-05-24)  BMP: Na/K/Cl/Bicarb/BUN/Cr/Gluc: 138/4.2/103/25/36/1.87/308 (06-05-24)  Anion Gap: 10 (06-05-24)  eGFR: 27 (06-05-24)  Calcium: 9.7 (06-05-24)  Phosphorus: -- (06-05-24)  Magnesium: -- (06-05-24)  LFT - Alb/Tprot/Tbili/Dbili/AlkPhos/ALT/AST: 3.5/--/0.3/--/135/10/19 (06-05-24)  PT/aPTT/INR: 14.9/37.1/1.32 (06-05-24)    CBC - WBC/HGB/HTC/PLT: 5.72/8.0/24.8/104 (06-05-24)BMP: Na/K/Cl/Bicarb/BUN/Cr/Gluc: 138/4.2/103/25/36/1.87/308 (06-05-24)  Anion Gap: 10 (06-05-24)  eGFR: 27 (06-05-24)  Calcium: 9.7 (06-05-24)  Phosphorus: -- (06-05-24)  Magnesium: -- (06-05-24)    CAPILLARY BLOOD GLUCOSE & INSULIN RECEIVED  278 mg/dL (06-04 @ 11:37)  255 mg/dL (06-04 @ 20:06) 6   353 mg/dL (06-05 @ 06:34) 10        06-04-24 @ 07:01  -  06-05-24 @ 07:00  --------------------------------------------------------  IN: 630 mL / OUT: 600 mL / NET: 30 mL        ASSESSMENT / RECOMMENDATIONS    NATALEE HERNANDEZ is a 79y Female with a past medical history of HTN, HLD, CAD s/p stents, AF (on eliquis), and DM who presents to [facility] on 6/4 for planned Watchman procedure with Dr. Ortiz.  Pt originally from [facility].  She previously had a fall, which entered her to the high fall risk, subsequently discontinued her on AC.       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       On admission, labs revealed BUN 40, Cr 1.88, glucose 273, a1c 9.8    Endocrinology has been consulted for Diabetes Management.      A1C: 9.8 %  BUN: 36  Creatinine: 1.87  GFR: 27  Weight: 54.4  BMI: 20.6    # Type 2 diabetes mellitus with hyperglycemia  - s/p 8 NPH this morning  - Please keep lantus   units at bedtime.   - Keep lispro   units before each meal.  - Continue lispro moderate dose sliding scale before meals and at bedtime.  - Patient's fingerstick glucose goal is 100-180 mg/dL.    - Discharge recommendations to be discussed.   - Patient can follow up at discharge with [facility] by calling [phone] to make an appointment.      Case discussed with Dr. Valentine. Primary team updated.       Felisa Mcneill  Endocrinology Fellow    Service Pager: [phone]  HISTORY OF PRESENT ILLNESS  NATALEE HERNANDEZ is a 79y Female with a past medical history of HTN, HLD, CAD s/p stents, AF (on eliquis), and DM who presents to [facility] on 6/4 for planned Watchman procedure with Dr. Ortiz.  Pt originally from [facility].  She previously had a fall, which entered her to the high fall risk, subsequently discontinued her on AC.       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       On admission, labs revealed BUN 40, Cr 1.88, glucose 273, a1c 9.8    Endocrinology has been consulted for Diabetes Management.    DIABETES HISTORY  - Age at diagnosis:  30 years ago  - Diabetes managed outpatient by: doctor at [facility]  - Current Therapy:  Tresiba 6 u daily , Novolog 4-8 u with meals (average 6 u), states Januvia 25 daily??? (on the med rec, shows as tradjenta 5)  - History of other regimens:  metformin  - History of hypoglycemia:  sometimes in the morning 70s, however cannot recall in which circumstances she drops  - History of DKA/HHS:  N/A  - Diabetic Complications:  denies  - Home glucose readings:  morning glucose , post prandials usually high 100s, sometimes above 200  - Diet:          > Breakfast:  cheerios        > Lunch:  tuna fish sandwich        > Dinner: starch + vegetables + meat        > Snacks: grapes, pears, sometimes apple juice and root beer    FAMILY HISTORY  - Diabetes: denies    ALLERGIES  NSAIDs (Swelling)  codeine (Unknown)      CURRENT MEDICATIONS  acetaminophen     Tablet .. 650 milliGRAM(s) Oral every 6 hours PRN  anastrozole 1 milliGRAM(s) Oral daily  apixaban 2.5 milliGRAM(s) Oral every 12 hours  atorvastatin 80 milliGRAM(s) Oral at bedtime  ceFAZolin   IVPB 2000 milliGRAM(s) IV Intermittent every 8 hours  chlorhexidine 2% Cloths 1 Application(s) Topical daily  dextrose 10% Bolus 125 milliLiter(s) IV Bolus once  dextrose 5%. 1000 milliLiter(s) IV Continuous <Continuous>  dextrose 5%. 1000 milliLiter(s) IV Continuous <Continuous>  dextrose 50% Injectable 25 Gram(s) IV Push once  dextrose 50% Injectable 12.5 Gram(s) IV Push once  dextrose Oral Gel 15 Gram(s) Oral once PRN  glucagon  Injectable 1 milliGRAM(s) IntraMuscular once  insulin lispro (ADMELOG) corrective regimen sliding scale   SubCutaneous Before meals and at bedtime  insulin lispro Injectable (ADMELOG) 4 Unit(s) SubCutaneous before lunch  losartan 25 milliGRAM(s) Oral daily  pantoprazole    Tablet 40 milliGRAM(s) Oral once  polyethylene glycol 3350 17 Gram(s) Oral daily  senna 2 Tablet(s) Oral at bedtime  sodium chloride 0.9%. 1000 milliLiter(s) IV Continuous <Continuous>      REVIEW OF SYSTEMS  Constitutional:  Negative fever, chills   Cardiovascular:  Negative for chest pain or palpitations.  Respiratory:  Negative for cough, wheezing, or shortness of breath.   Gastrointestinal:  Negative for nausea, vomiting, diarrhea, constipation, or abdominal pain.  Genitourinary:  Negative frequency, urgency or dysuria.  Neurologic:  No headache, confusion, dizziness    PHYSICAL EXAM  Vital Signs Last 24 Hrs  T(C): 36.7 (05 Jun 2024 08:58), Max: 36.8 (05 Jun 2024 00:51)  T(F): 98 (05 Jun 2024 08:58), Max: 98.2 (05 Jun 2024 00:51)  HR: 87 (05 Jun 2024 09:00) (71 - 106)  BP: 168/72 (05 Jun 2024 09:00) (93/52 - 168/72)  BP(mean): 104 (05 Jun 2024 09:00) (68 - 104)  RR: 18 (05 Jun 2024 09:00) (16 - 20)  SpO2: 100% (05 Jun 2024 09:00) (97% - 100%)    Parameters below as of 05 Jun 2024 09:00  Patient On (Oxygen Delivery Method): room air      Constitutional: Awake, alert  HEENT: Normocephalic, atraumatic, FAROOQ  Neck: supple, no acanthosis, no thyromegaly or palpable thyroid nodules.  Respiratory: Lungs clear to ausculation bilaterally.   Cardiovascular: regular rhythm, normal S1 and S2, no audible murmurs.   GI: soft, non-tender, non-distended, bowel sounds present  Extremities: No lower extremity edema, peripheral pulses present.     LABS  CBC - WBC/HGB/HTC/PLT: 5.72/8.0/24.8/104 (06-05-24)  BMP: Na/K/Cl/Bicarb/BUN/Cr/Gluc: 138/4.2/103/25/36/1.87/308 (06-05-24)  Anion Gap: 10 (06-05-24)  eGFR: 27 (06-05-24)  Calcium: 9.7 (06-05-24)  Phosphorus: -- (06-05-24)  Magnesium: -- (06-05-24)  LFT - Alb/Tprot/Tbili/Dbili/AlkPhos/ALT/AST: 3.5/--/0.3/--/135/10/19 (06-05-24)  PT/aPTT/INR: 14.9/37.1/1.32 (06-05-24)    CBC - WBC/HGB/HTC/PLT: 5.72/8.0/24.8/104 (06-05-24)BMP: Na/K/Cl/Bicarb/BUN/Cr/Gluc: 138/4.2/103/25/36/1.87/308 (06-05-24)  Anion Gap: 10 (06-05-24)  eGFR: 27 (06-05-24)  Calcium: 9.7 (06-05-24)  Phosphorus: -- (06-05-24)  Magnesium: -- (06-05-24)    CAPILLARY BLOOD GLUCOSE & INSULIN RECEIVED  278 mg/dL (06-04 @ 11:37)  255 mg/dL (06-04 @ 20:06) 6   353 mg/dL (06-05 @ 06:34) 10  331 - NPH 8 + 8 + 4      06-04-24 @ 07:01  -  06-05-24 @ 07:00  --------------------------------------------------------  IN: 630 mL / OUT: 600 mL / NET: 30 mL        ASSESSMENT / RECOMMENDATIONS    NATALEE HERNANDEZ is a 79y Female with a past medical history of HTN, HLD, CAD s/p stents, AF (on eliquis), and DM who presents to [facility] on 6/4 for planned Watchman procedure with Dr. Ortiz.  Pt originally from [facility].  She previously had a fall, which entered her to the high fall risk, subsequently discontinued her on AC.       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       On admission, labs revealed BUN 40, Cr 1.88, glucose 273, a1c 9.8    Endocrinology has been consulted for Diabetes Management.      A1C: 9.8 %  BUN: 36  Creatinine: 1.87  GFR: 27  Weight: 54.4  BMI: 20.6    # Type 2 diabetes mellitus with hyperglycemia  - s/p 8 NPH this morning  - Please keep lantus   units at bedtime.   - Keep lispro   units before each meal.  - Continue lispro moderate dose sliding scale before meals and at bedtime.  - Patient's fingerstick glucose goal is 100-180 mg/dL.    - Discharge recommendations:    - Patient can follow up at discharge with [facility] by calling [phone] to make an appointment.      Case discussed with Dr. Valentine. Primary team updated.       Felisa Mcneill  Endocrinology Fellow    Service Pager: [phone]

## 2024-06-05 NOTE — DISCHARGE NOTE PROVIDER - NSDCMRMEDTOKEN_GEN_ALL_CORE_FT
anastrozole 1 mg oral tablet: 1 tab(s) orally once a day  apixaban 2.5 mg oral tablet: 1 tab(s) orally 2 times a day  atorvastatin 80 mg oral tablet: 1 tab(s) orally once a day (at bedtime)  Calcium 600+D oral tablet: 1 tab(s) orally 2 times a day  losartan 50 mg oral tablet: 1 tab(s) orally once a day  NovoLOG 100 units/mL subcutaneous solution: 4 unit(s) subcutaneous once a day, @ lunch  omeprazole 20 mg oral delayed release capsule: 1 cap(s) orally once a day  Tradjenta 5 mg oral tablet: 1 tab(s) orally once a day  Tresiba 100 units/mL subcutaneous solution: 6 unit(s) subcutaneous once a day , in AM   acetaminophen 325 mg oral tablet: 2 tab(s) orally every 6 hours As needed Temp greater or equal to 38C (100.4F), Mild Pain (1 - 3)  anastrozole 1 mg oral tablet: 1 tab(s) orally once a day  atorvastatin 80 mg oral tablet: 1 tab(s) orally once a day (at bedtime)  Calcium 600+D oral tablet: 1 tab(s) orally 2 times a day  losartan 50 mg oral tablet: 1 tab(s) orally once a day  NovoLOG 100 units/mL subcutaneous solution: 4 unit(s) subcutaneous once a day, @ lunch  omeprazole 20 mg oral delayed release capsule: 1 cap(s) orally once a day  Tradjenta 5 mg oral tablet: 1 tab(s) orally once a day  Tresiba 100 units/mL subcutaneous solution: 6 unit(s) subcutaneous once a day , in AM   acetaminophen 325 mg oral tablet: 2 tab(s) orally every 6 hours As needed Temp greater or equal to 38C (100.4F), Mild Pain (1 - 3)  anastrozole 1 mg oral tablet: 1 tab(s) orally once a day  apixaban 2.5 mg oral tablet: 1 tab(s) orally 2 times a day  atorvastatin 80 mg oral tablet: 1 tab(s) orally once a day (at bedtime)  Calcium 600+D oral tablet: 1 tab(s) orally 2 times a day  losartan 50 mg oral tablet: 1 tab(s) orally once a day  NovoLOG 100 units/mL subcutaneous solution: 4 unit(s) subcutaneous once a day, @ lunch  omeprazole 20 mg oral delayed release capsule: 1 cap(s) orally once a day  senna leaf extract oral tablet: 2 tab(s) orally once a day (at bedtime)  Tradjenta 5 mg oral tablet: 1 tab(s) orally once a day  Tresiba 100 units/mL subcutaneous solution: 6 unit(s) subcutaneous once a day , in AM

## 2024-06-05 NOTE — PHYSICAL THERAPY INITIAL EVALUATION ADULT - IMPAIRMENTS CONTRIBUTING IMPAIRED BED MOBILITY, REHAB EVAL

## 2024-06-05 NOTE — PHYSICAL THERAPY INITIAL EVALUATION ADULT - LEVEL OF INDEPENDENCE: GAIT, REHAB EVAL
close supervision. Patient had 1 ep of LOB when trialing no AD , req CG to reestablish balance./contact guard

## 2024-06-05 NOTE — PHYSICAL THERAPY INITIAL EVALUATION ADULT - ASSISTIVE DEVICE FOR TRANSFER: GAIT, REHAB EVAL
patient initially amb with b/l UE on tele, progressed to no AD. Noted inc unsteadiness with no AD. Trialed RW vs. large-based quad cane; patient steady with use of large-based quad cane./rolling walker/quad cane

## 2024-06-05 NOTE — DISCHARGE NOTE PROVIDER - NSDCCPTREATMENT_GEN_ALL_CORE_FT
PRINCIPAL PROCEDURE  Procedure: Insertion, Watchman left atrial appendage closure device  Findings and Treatment:

## 2024-06-07 DIAGNOSIS — I25.10 ATHEROSCLEROTIC HEART DISEASE OF NATIVE CORONARY ARTERY WITHOUT ANGINA PECTORIS: ICD-10-CM

## 2024-06-07 DIAGNOSIS — Z90.49 ACQUIRED ABSENCE OF OTHER SPECIFIED PARTS OF DIGESTIVE TRACT: ICD-10-CM

## 2024-06-07 DIAGNOSIS — Z88.6 ALLERGY STATUS TO ANALGESIC AGENT: ICD-10-CM

## 2024-06-07 DIAGNOSIS — C50.919 MALIGNANT NEOPLASM OF UNSPECIFIED SITE OF UNSPECIFIED FEMALE BREAST: ICD-10-CM

## 2024-06-07 DIAGNOSIS — I48.20 CHRONIC ATRIAL FIBRILLATION, UNSPECIFIED: ICD-10-CM

## 2024-06-07 DIAGNOSIS — Z79.811 LONG TERM (CURRENT) USE OF AROMATASE INHIBITORS: ICD-10-CM

## 2024-06-07 DIAGNOSIS — I10 ESSENTIAL (PRIMARY) HYPERTENSION: ICD-10-CM

## 2024-06-07 DIAGNOSIS — Z95.5 PRESENCE OF CORONARY ANGIOPLASTY IMPLANT AND GRAFT: ICD-10-CM

## 2024-06-07 DIAGNOSIS — Z88.5 ALLERGY STATUS TO NARCOTIC AGENT: ICD-10-CM

## 2024-06-07 DIAGNOSIS — E78.5 HYPERLIPIDEMIA, UNSPECIFIED: ICD-10-CM

## 2024-06-10 ENCOUNTER — APPOINTMENT (OUTPATIENT)
Dept: HEART AND VASCULAR | Facility: CLINIC | Age: 79
End: 2024-06-10
Payer: COMMERCIAL

## 2024-06-10 VITALS
SYSTOLIC BLOOD PRESSURE: 180 MMHG | TEMPERATURE: 97.3 F | WEIGHT: 120 LBS | RESPIRATION RATE: 16 BRPM | DIASTOLIC BLOOD PRESSURE: 76 MMHG | HEART RATE: 66 BPM | HEIGHT: 64 IN | BODY MASS INDEX: 20.49 KG/M2 | OXYGEN SATURATION: 100 %

## 2024-06-10 PROCEDURE — 99213 OFFICE O/P EST LOW 20 MIN: CPT

## 2024-06-10 PROCEDURE — 93000 ELECTROCARDIOGRAM COMPLETE: CPT

## 2024-06-11 NOTE — DISCUSSION/SUMMARY
[FreeTextEntry1] : Ms. Paul is a pleasant 79 year-old female with a past medical history significant for HTN, HLD, CAD s/p stents in 3/2021, Left Breast CA (2015, s/p lumpectomy, CTX / XRT), Right Breast CA (2023, s/p Lumpectomy, CTX / XRT), atrial fibrillation and recurrent falls S/P LAAC with the Watchman on 6/4/24  1.  AF  Maintaining sinus rhythm  Continue Metoprolol for rate control  2.  Stroke Risk CHADS VASC 4 S/P LAAC 6/4/24 Continue Eliquis for at least 45 days post procedure.  Will need CT vs. VERA to evaluate device placement and r/o thrombus.  To be coordinated with Dr. Irvin.

## 2024-06-11 NOTE — HISTORY OF PRESENT ILLNESS
[FreeTextEntry1] : Ms. Paul is a pleasant 79 year-old female with a past medical history significant for HTN, HLD, CAD s/p stents in 3/2021, Left Breast CA (2015, s/p lumpectomy, CTX / XRT), Right Breast CA (2023, s/p Lumpectomy, CTX / XRT), atrial fibrillation and recurrent falls S/P LAAC with the Watchman on 6/4/24.  She feels well post procedure and offers no acute complaints.  No groin complaints.  No CP, dizziness, SOB/EHNDRICKS.  In September 2023 she had a fall with head trauma.  Eliquis was discontinued.  She has had three significant falls within the last three months- one resulting in an elbow fracture.  She has been on low dose Eliquis 2.5 m BID since January 2024.  Now s/p   Accompanied by her daughter, Stalin.

## 2024-06-11 NOTE — CARDIOLOGY SUMMARY
[de-identified] : 6/10/24 NSR 6/3/24 NSR, PVC [de-identified] : TTE 10/18/23 EF 69%, la index volume mildly increased

## 2024-09-05 ENCOUNTER — NON-APPOINTMENT (OUTPATIENT)
Age: 79
End: 2024-09-05

## 2024-12-06 NOTE — H&P ADULT - SOCIAL HISTORY
Chief Complaint   Patient presents with    6 Month Follow-Up     Patient wants her zoloft to be prescribed by PCP due to [facility] not following up with patient.     Jaw Pain     Had dental issues that were resolved. She went to her follow-up at the dentist yesterday. They told her the pain would go away but still having pain. She has x-rays. She found she grinds teeth at night. Left jaw pain & swollen.    Health Maintenance     Yes to mammogram        HPI: Simi JANE Gold 49 y.o. female presenting for       Dental pain   Had a follow up with the dentist   Reprots that her mastic muscle is very tight and painful   Feels lumps in the area.  Unsure if there is an infection or if there is a mucous cyst  Reports that she was also told she was grinding her teeth.        Anxiety depression   Will see the psychology/psych - [facility] in [place]   Patient will be on klonopin BID PRN - did not have to thierno it since the biopsy.   And will start the Zoloft 25 mg daily   Stable at this time.   Plans to start the PTSD treatment.    F/u   Patient is doing well on the zoloft on 1.5 tablets of hte zoloft.   Doing great. Denies any Si or HI  Patient is taking vyvanse and quilipta.              Current Outpatient Medications   Medication Sig Dispense Refill    Ascorbic Acid (VITAMIN C) 500 MG CHEW       calcium carbonate (CALCIUM 600) 600 MG TABS tablet       Cholecalciferol 50 MCG (2000 UT) TABS Take 1 tablet by mouth daily      sertraline (ZOLOFT) 25 MG tablet Take 1.5 tablets by mouth daily 30 tablet     pantoprazole (PROTONIX) 20 MG tablet Take 1 tablet by mouth daily 90 tablet 3    famotidine (PEPCID) 20 MG tablet Take 1 tablet by mouth 2 times daily 60 tablet 3    lisdexamfetamine (VYVANSE) 10 MG capsule Take 1 capsule by mouth 2 times daily.      apixaban (ELIQUIS) 5 MG TABS tablet Take 1 tablet by mouth 2 times daily      Atogepant (QULIPTA) 60 MG TABS Take 1 tablet by mouth daily      ferrous sulfate (FE TABS 325) 325 (65 Fe) MG EC  No

## (undated) DEVICE — ELCTR REM POLYHESIVE ADULT PT RETURN 15FT

## (undated) DEVICE — SUT VICRYL 2-0 27" CT-1

## (undated) DEVICE — HEMOSTASIS VALVE PHD SM BORE W/ SPRING METAL INSERTION TOOL AND TORQUE DEVICE

## (undated) DEVICE — DRAPE ULTRASOUND PROBE COVER CATH FAMILY CONNECTOR

## (undated) DEVICE — DRAPE PROBE COVER 5" X 96"

## (undated) DEVICE — NDL TRANSSEPTAL CRV 18GX71CM

## (undated) DEVICE — PACK CATH CARDIAC KIT DSG SCSR FRCP

## (undated) DEVICE — ELCTR ZOLL DEFIBRILLATOR PAD NO REPLACEMENT

## (undated) DEVICE — CATH CARDIAC LT CUST 601201318

## (undated) DEVICE — WARMING BLANKET LOWER ADULT

## (undated) DEVICE — VASCULAR DILATOR KIT 8,12,16,20, 24FR

## (undated) DEVICE — DRAPE ULTRASOUND TRANSDUCER COVER

## (undated) DEVICE — TUBING EXTENSION HI PRESSURE FLEX 48"

## (undated) DEVICE — NDL TRANSEPTAL BRK-1 98CM

## (undated) DEVICE — POSITIONER FOAM EGG CRATE ULNAR 2PCS (PINK)